# Patient Record
Sex: FEMALE | Race: WHITE
[De-identification: names, ages, dates, MRNs, and addresses within clinical notes are randomized per-mention and may not be internally consistent; named-entity substitution may affect disease eponyms.]

---

## 2017-08-26 ENCOUNTER — HEALTH MAINTENANCE LETTER (OUTPATIENT)
Age: 47
End: 2017-08-26

## 2017-11-25 ENCOUNTER — RADIANT APPOINTMENT (OUTPATIENT)
Dept: GENERAL RADIOLOGY | Facility: CLINIC | Age: 47
End: 2017-11-25
Attending: PHYSICIAN ASSISTANT
Payer: COMMERCIAL

## 2017-11-25 ENCOUNTER — OFFICE VISIT (OUTPATIENT)
Dept: URGENT CARE | Facility: URGENT CARE | Age: 47
End: 2017-11-25
Payer: COMMERCIAL

## 2017-11-25 VITALS
OXYGEN SATURATION: 98 % | TEMPERATURE: 97.6 F | WEIGHT: 116 LBS | BODY MASS INDEX: 18.21 KG/M2 | SYSTOLIC BLOOD PRESSURE: 97 MMHG | HEART RATE: 83 BPM | DIASTOLIC BLOOD PRESSURE: 62 MMHG | HEIGHT: 67 IN

## 2017-11-25 DIAGNOSIS — M25.562 ACUTE PAIN OF LEFT KNEE: ICD-10-CM

## 2017-11-25 DIAGNOSIS — M25.562 ACUTE PAIN OF LEFT KNEE: Primary | ICD-10-CM

## 2017-11-25 PROCEDURE — 73562 X-RAY EXAM OF KNEE 3: CPT | Mod: LT

## 2017-11-25 PROCEDURE — 99203 OFFICE O/P NEW LOW 30 MIN: CPT | Performed by: PHYSICIAN ASSISTANT

## 2017-11-25 NOTE — PATIENT INSTRUCTIONS
Follow up with primary care provider this week    Knee Pain  Knee pain is very common. It s especially common in active people who put a lot of pressure on their knees, like runners. It affects women more often than men.  Your kneecap (patella) is a thick, round bone. It covers and protects the front portion of your knee joint. It moves along a groove in your thighbone (femur) as part of the patellofemoral joint. A layer of cartilage surrounds the underside of your kneecap. This layer protects it from grinding against your femur.  When this cartilage softens and breaks down, it can cause knee pain. This is partly because of repetitive stress. The stress irritates the lining of the joint. This causes pain in the underlying bone.  What causes knee pain?  Many things can cause knee pain. You may have more than one cause. Some of these include:    Overuse of the knee joint    The kneecap doesn t line up with the tissue around it    Damage to small nerves in the area    Damage to the ligament-like structure that holds the kneecap in place (retinaculum)    Breakdown of the bone under the cartilage    Swelling in the soft tissues around the kneecap    Injury  You might be more likely to have knee pain if you:    Exercise a lot    Recently increased the intensity of your workouts    Have a body mass index (BMI) greater than 25    Have poor alignment of your kneecap    Walk with your feet turned overly outward or inward    Have weakness in surrounding muscle groups (inner quad or hip adductor muscles)    Have too much tightness in surrounding muscle groups (hamstrings or iliotibial band)    Have a recent history of injury to the area    Are female  Symptoms of knee pain  This type of knee pain is a dull, aching pain in the front of the knee in the area under and around the kneecap. This pain may start quickly or slowly. Your pain might be worse when you squat, run, or sit for a long time. You might also sometimes feel like  your knee is giving out. You may have symptoms in one or both of your knees.  Diagnosing knee pain  Your healthcare provider will ask about your medical history and your symptoms. Be sure to describe any activities that make your knee pain worse. He or she will look at your knee. This will include tests of your range of motion, strength, and areas of pain of your knee. Your knee alignment will be checked.  Your healthcare provider will need to rule out other causes of your knee pain, such as arthritis. You may need an imaging test, such as an X-ray or MRI.  Treatment for knee pain  Treatments that can help ease your symptoms may include:    Avoiding activities for a while that make your pain worse, returning to activity over time    Icing the outside of your knee when it causes you pain    Taking over-the-counter pain medicine    Wearing a knee brace or taping your knee to support it    Wearing special shoe inserts to help keep your feet in the proper alignment    Doing special exercises to stretch and strengthen the muscles around your hip and your knee  These steps help most people manage knee pain. But some cases of knee pain need to be treated with surgery. You may need surgery right away. Or you may need it later if other treatments don t work. Your healthcare provider may refer you to an orthopedic surgeon. He or she will talk with you about your choices.  Preventing knee pain  Losing weight and correcting excess muscle tightness or muscle weakness may help lower your risk.  In some cases, you can prevent knee pain. To help prevent a flare-up of knee pain, you do these things:    Regularly do all the exercises your doctor or physical therapist advises    Support your knee as advised by your doctor or physical therapist    Increase training gradually, and ease up on training when needed    Have an expert check your gait for running or other sporting activities    Stretch properly before and after  exercise    Replace your running shoes regularly    Lose excess weight     When to call your healthcare provider  Call your healthcare provider right away if:    Your symptoms don t get better after a few weeks of treatment    You have any new symptoms   Date Last Reviewed: 4/1/2017 2000-2017 [employer]. [address] [place] [address]. All rights reserved. This information is not intended as a substitute for professional medical care. Always follow your healthcare professional's instructions.        R.I.C.E.    R.I.C.E. stands for Rest, Ice, Compression, and Elevation. Doing these things helps limit pain and swelling after an injury. R.I.C.E. also helps injuries heal faster. Use R.I.C.E. for sprains, strains, and severe bruises or bumps. Follow the tips on this handout and begin R.I.C.E. as soon as possible after an injury.  ? Rest  Pain is your body s way of telling you to rest an injured area. Whether you have hurt an elbow, hand, foot, or knee, limiting its use will prevent further injury and help you heal.  ? Ice  Applying ice right after an injury helps prevent swelling and reduce pain. Don t place ice directly on your skin.    Wrap a cold pack or bag of ice in a thin cloth. Place it over the injured area.    Ice for 10 minutes every 3 hours. Don t ice for more than 20 minutes at a time.  ? Compression  Putting pressure (compression) on an injury helps prevent swelling and provides support.    Wrap the injured area firmly with an elastic bandage. If your hand or foot tingles, becomes discolored, or feels cold to the touch, the bandage may be too tight. Rewrap it more loosely.    If your bandage becomes too loose, rewrap it.    Do not wear an elastic bandage overnight.  ? Elevation  Keeping an injury elevated helps reduce swelling, pain, and throbbing. Elevation is most effective when the injury is kept elevated higher than the heart.     Call your healthcare provider if you notice  any of the following:    Fingers or toes feel numb, are cold to the touch, or change color    Skin looks shiny or tight    Pain, swelling, or bruising worsens and is not improved with elevation   Date Last Reviewed: 9/3/2015    6118-8329 [employer]. [address] [place] [address]. All rights reserved. This information is not intended as a substitute for professional medical care. Always follow your healthcare professional's instructions.

## 2017-11-25 NOTE — LETTER
[facility]  [address] [address]  Phone: [phone]    November 25, 2017        Ayla Del Toro  [address] [facility] [address]          To whom it may concern:    RE: Ayla Del Toro    Patient was seen and treated today at our clinic.  Please excuse absence on 11/27/17.      Please contact me for questions or concerns.      Sincerely,        Maco Mallory PA-C

## 2017-11-25 NOTE — NURSING NOTE
"Chief Complaint   Patient presents with     Urgent Care     Pt in clinic c/o left knee/leg pain.     Musculoskeletal Problem       Initial BP 97/62  Pulse 83  Temp 97.6  F (36.4  C) (Tympanic)  Ht 5' 6.5\" (1.689 m)  Wt 116 lb (52.6 kg)  SpO2 98%  BMI 18.44 kg/m2 Estimated body mass index is 18.44 kg/(m^2) as calculated from the following:    Height as of this encounter: 5' 6.5\" (1.689 m).    Weight as of this encounter: 116 lb (52.6 kg).  Medication Reconciliation: complete   Raya Sharif/ MA    "

## 2017-11-25 NOTE — MR AVS SNAPSHOT
After Visit Summary   11/25/2017    Ayla Del Toro    MRN: [id number]           Patient Information     Date Of Birth          1970        Visit Information        Provider Department      11/25/2017 10:35 AM Maco Mallory PA-C [facility]        Today's Diagnoses     Acute pain of left knee    -  1      Care Instructions    Follow up with primary care provider this week    Knee Pain  Knee pain is very common. It s especially common in active people who put a lot of pressure on their knees, like runners. It affects women more often than men.  Your kneecap (patella) is a thick, round bone. It covers and protects the front portion of your knee joint. It moves along a groove in your thighbone (femur) as part of the patellofemoral joint. A layer of cartilage surrounds the underside of your kneecap. This layer protects it from grinding against your femur.  When this cartilage softens and breaks down, it can cause knee pain. This is partly because of repetitive stress. The stress irritates the lining of the joint. This causes pain in the underlying bone.  What causes knee pain?  Many things can cause knee pain. You may have more than one cause. Some of these include:    Overuse of the knee joint    The kneecap doesn t line up with the tissue around it    Damage to small nerves in the area    Damage to the ligament-like structure that holds the kneecap in place (retinaculum)    Breakdown of the bone under the cartilage    Swelling in the soft tissues around the kneecap    Injury  You might be more likely to have knee pain if you:    Exercise a lot    Recently increased the intensity of your workouts    Have a body mass index (BMI) greater than 25    Have poor alignment of your kneecap    Walk with your feet turned overly outward or inward    Have weakness in surrounding muscle groups (inner quad or hip adductor muscles)    Have too much tightness in surrounding muscle  groups (hamstrings or iliotibial band)    Have a recent history of injury to the area    Are female  Symptoms of knee pain  This type of knee pain is a dull, aching pain in the front of the knee in the area under and around the kneecap. This pain may start quickly or slowly. Your pain might be worse when you squat, run, or sit for a long time. You might also sometimes feel like your knee is giving out. You may have symptoms in one or both of your knees.  Diagnosing knee pain  Your healthcare provider will ask about your medical history and your symptoms. Be sure to describe any activities that make your knee pain worse. He or she will look at your knee. This will include tests of your range of motion, strength, and areas of pain of your knee. Your knee alignment will be checked.  Your healthcare provider will need to rule out other causes of your knee pain, such as arthritis. You may need an imaging test, such as an X-ray or MRI.  Treatment for knee pain  Treatments that can help ease your symptoms may include:    Avoiding activities for a while that make your pain worse, returning to activity over time    Icing the outside of your knee when it causes you pain    Taking over-the-counter pain medicine    Wearing a knee brace or taping your knee to support it    Wearing special shoe inserts to help keep your feet in the proper alignment    Doing special exercises to stretch and strengthen the muscles around your hip and your knee  These steps help most people manage knee pain. But some cases of knee pain need to be treated with surgery. You may need surgery right away. Or you may need it later if other treatments don t work. Your healthcare provider may refer you to an orthopedic surgeon. He or she will talk with you about your choices.  Preventing knee pain  Losing weight and correcting excess muscle tightness or muscle weakness may help lower your risk.  In some cases, you can prevent knee pain. To help prevent a  flare-up of knee pain, you do these things:    Regularly do all the exercises your doctor or physical therapist advises    Support your knee as advised by your doctor or physical therapist    Increase training gradually, and ease up on training when needed    Have an expert check your gait for running or other sporting activities    Stretch properly before and after exercise    Replace your running shoes regularly    Lose excess weight     When to call your healthcare provider  Call your healthcare provider right away if:    Your symptoms don t get better after a few weeks of treatment    You have any new symptoms   Date Last Reviewed: 4/1/2017 2000-2017 [employer]. [address] [place] [address]. All rights reserved. This information is not intended as a substitute for professional medical care. Always follow your healthcare professional's instructions.        R.I.C.E.    R.I.C.E. stands for Rest, Ice, Compression, and Elevation. Doing these things helps limit pain and swelling after an injury. R.I.C.E. also helps injuries heal faster. Use R.I.C.E. for sprains, strains, and severe bruises or bumps. Follow the tips on this handout and begin R.I.C.E. as soon as possible after an injury.  ? Rest  Pain is your body s way of telling you to rest an injured area. Whether you have hurt an elbow, hand, foot, or knee, limiting its use will prevent further injury and help you heal.  ? Ice  Applying ice right after an injury helps prevent swelling and reduce pain. Don t place ice directly on your skin.    Wrap a cold pack or bag of ice in a thin cloth. Place it over the injured area.    Ice for 10 minutes every 3 hours. Don t ice for more than 20 minutes at a time.  ? Compression  Putting pressure (compression) on an injury helps prevent swelling and provides support.    Wrap the injured area firmly with an elastic bandage. If your hand or foot tingles, becomes discolored, or feels cold to the  "touch, the bandage may be too tight. Rewrap it more loosely.    If your bandage becomes too loose, rewrap it.    Do not wear an elastic bandage overnight.  ? Elevation  Keeping an injury elevated helps reduce swelling, pain, and throbbing. Elevation is most effective when the injury is kept elevated higher than the heart.     Call your healthcare provider if you notice any of the following:    Fingers or toes feel numb, are cold to the touch, or change color    Skin looks shiny or tight    Pain, swelling, or bruising worsens and is not improved with elevation   Date Last Reviewed: 9/3/2015    9289-3397 [employer]. [address] [place] [address]. All rights reserved. This information is not intended as a substitute for professional medical care. Always follow your healthcare professional's instructions.                Follow-ups after your visit        Who to contact     If you have questions or need follow up information about today's clinic visit or your schedule please contact [facility] directly at [phone].  Normal or non-critical lab and imaging results will be communicated to you by MyChart, letter or phone within 4 business days after the clinic has received the results. If you do not hear from us within 7 days, please contact the clinic through [employer] or phone. If you have a critical or abnormal lab result, we will notify you by phone as soon as possible.  Submit refill requests through [employer] or call your pharmacy and they will forward the refill request to us. Please allow 3 business days for your refill to be completed.          Additional Information About Your Visit        [employer] Information     [employer] lets you send messages to your doctor, view your test results, renew your prescriptions, schedule appointments and more. To sign up, go to [email or web address] . Click on \"Log in\" on the left side of the screen, which will take you to the " "Welcome page. Then click on \"Sign up Now\" on the right side of the page.     You will be asked to enter the access code listed below, as well as some personal information. Please follow the directions to create your username and password.     Your access code is: [email or web address]  Expires: 2018 12:17 PM     Your access code will  in 90 days. If you need help or a new code, please call your [place] clinic or [phone].        Care EveryWhere ID     This is your Care EveryWhere ID. This could be used by other organizations to access your [place] medical records  [id number]        Your Vitals Were     Pulse Temperature Height Pulse Oximetry BMI (Body Mass Index)       83 97.6  F (36.4  C) (Tympanic) 5' 6.5\" (1.689 m) 98% 18.44 kg/m2        Blood Pressure from Last 3 Encounters:   17 97/62   07 100/72   01/10/07 112/84    Weight from Last 3 Encounters:   17 116 lb (52.6 kg)   07 146 lb 4 oz (66.3 kg)   01/10/07 151 lb 8 oz (68.7 kg)               Primary Care Provider Office Phone # Fax #    [facility] [phone] [address] [facility] [address]        Equal Access to Services     NONI HERMAN AH: Hadtia Potts, waaxda darryl, qaybta kaalkenisha carcamo. So [facility] [phone].    ATENCIÓN: Si habla español, tiene a nogueira disposición servicios gratuitos de asistencia lingüística. Oneyda al [phone].    We comply with applicable federal civil rights laws and [place] laws. We do not discriminate on the basis of race, color, national origin, age, disability, sex, sexual orientation, or gender identity.            Thank you!     Thank you for choosing [facility]  for your care. Our goal is always to provide you with excellent care. Hearing back from our patients is one way we can continue to improve our services. Please take a few minutes to complete " the written survey that you may receive in the mail after your visit with us. Thank you!             Your Updated Medication List - Protect others around you: Learn how to safely use, store and throw away your medicines at www.disposemymeds.org.          This list is accurate as of: 11/25/17 12:17 PM.  Always use your most recent med list.                   Brand Name Dispense Instructions for use Diagnosis    klonoPIN 0.5 MG tablet   Generic drug:  clonazePAM     30    1 TABLET AS NEEDED; MAX 3 TIMES DAILY    Anxiety state, unspecified, Depressive disorder, not elsewhere classified, Allergic rhinitis due to other allergen, Routine general medical examination at a health care facility       loratadine 10 MG tablet    CLARITIN    3 MONTHS    ONE DAILY    Allergic rhinitis due to other allergen       XANAX 0.5 MG tablet   Generic drug:  ALPRAZolam     20    1 tablet BID as needed    Anxiety state, unspecified, Depressive disorder, not elsewhere classified, Allergic rhinitis due to other allergen, Routine general medical examination at a health care facility       ZYRTEC 10 MG tablet   Generic drug:  cetirizine     3 MONTHS    ONE TABLET DAILY    Allergic rhinitis due to other allergen

## 2017-11-25 NOTE — PROGRESS NOTES
"SUBJECTIVE:  Chief Complaint   Patient presents with     Urgent Care     Pt in clinic c/o left knee/leg pain.     Musculoskeletal Problem     Ayla Del Toro is a 47 year old female presents with a chief complaint of left knee pain.  The injury occurred standing up from table last night.  History of left knee pain and exacerbations, but never like this.  Pain up to 8/10.      Able to bend, and support weight, but pain is exacerbated with weight bearing.  No locking or giving out.  No numbness, tingling, fever, or swelling.  No calf pain.       Past Medical History:   Diagnosis Date     Allergic rhinitis due to other allergen      Anxiety state, unspecified      Depressive disorder, not elsewhere classified      Current Outpatient Prescriptions   Medication Sig Dispense Refill     diclofenac (VOLTAREN) 50 MG EC tablet Take 1 tablet (50 mg) by mouth 3 times daily as needed for moderate pain 30 tablet 1     diclofenac (VOLTAREN) 50 MG EC tablet Take 1 tablet (50 mg) by mouth 3 times daily as needed for moderate pain 30 tablet 1     LORATADINE 10 MG OR TABS ONE DAILY 3 MONTHS 1 YEAR     ZYRTEC 10 MG OR TABS ONE TABLET DAILY 3 MONTHS 1 YEAR     XANAX 0.5 MG OR TABS 1 tablet BID as needed  20 0     KLONOPIN 0.5 MG OR TABS 1 TABLET AS NEEDED; MAX 3 TIMES DAILY 30 3     Social History   Substance Use Topics     Smoking status: Current Some Day Smoker     Last attempt to quit: 5/18/2006     Smokeless tobacco: Current User      Comment: recreationally     Alcohol use Yes       ROS:  Review of systems negative except as stated above.    EXAM:   BP 97/62  Pulse 83  Temp 97.6  F (36.4  C) (Tympanic)  Ht 5' 6.5\" (1.689 m)  Wt 116 lb (52.6 kg)  SpO2 98%  BMI 18.44 kg/m2  Gen: healthy,alert,no distress  Extremity: knee has point tenderness of lateral aspect.   There is not compromise to the distal circulation.  Pulses are +2 and CRT is brisk  CHEST: clear to auscultation  CV: regular rate and rhythm  MS: no gross deformities " noted, no evidence of inflammation in joints, FROM in all extremities.  SKIN: no suspicious lesions or rashes  NEURO: Normal strength and tone, sensory exam grossly normal, mentation intact and speech normal    X-RAY was WNL on initial read.  Radiologist read pending    ASSESSMENT:   (M25.562) Acute pain of left knee  (primary encounter diagnosis)  Comment: No swelling, discoloration appreciated.  Point tender at lateral aspect.  Joint intact  Plan: XR Knee Left 3 Views, diclofenac (VOLTAREN) 50         MG EC tablet, diclofenac (VOLTAREN) 50 MG EC         tablet        Follow up with PCP this week for further imaging.  Return sooner if pain is unbearable.      Patient Instructions     Follow up with primary care provider this week    Knee Pain  Knee pain is very common. It s especially common in active people who put a lot of pressure on their knees, like runners. It affects women more often than men.  Your kneecap (patella) is a thick, round bone. It covers and protects the front portion of your knee joint. It moves along a groove in your thighbone (femur) as part of the patellofemoral joint. A layer of cartilage surrounds the underside of your kneecap. This layer protects it from grinding against your femur.  When this cartilage softens and breaks down, it can cause knee pain. This is partly because of repetitive stress. The stress irritates the lining of the joint. This causes pain in the underlying bone.  What causes knee pain?  Many things can cause knee pain. You may have more than one cause. Some of these include:    Overuse of the knee joint    The kneecap doesn t line up with the tissue around it    Damage to small nerves in the area    Damage to the ligament-like structure that holds the kneecap in place (retinaculum)    Breakdown of the bone under the cartilage    Swelling in the soft tissues around the kneecap    Injury  You might be more likely to have knee pain if you:    Exercise a lot    Recently  increased the intensity of your workouts    Have a body mass index (BMI) greater than 25    Have poor alignment of your kneecap    Walk with your feet turned overly outward or inward    Have weakness in surrounding muscle groups (inner quad or hip adductor muscles)    Have too much tightness in surrounding muscle groups (hamstrings or iliotibial band)    Have a recent history of injury to the area    Are female  Symptoms of knee pain  This type of knee pain is a dull, aching pain in the front of the knee in the area under and around the kneecap. This pain may start quickly or slowly. Your pain might be worse when you squat, run, or sit for a long time. You might also sometimes feel like your knee is giving out. You may have symptoms in one or both of your knees.  Diagnosing knee pain  Your healthcare provider will ask about your medical history and your symptoms. Be sure to describe any activities that make your knee pain worse. He or she will look at your knee. This will include tests of your range of motion, strength, and areas of pain of your knee. Your knee alignment will be checked.  Your healthcare provider will need to rule out other causes of your knee pain, such as arthritis. You may need an imaging test, such as an X-ray or MRI.  Treatment for knee pain  Treatments that can help ease your symptoms may include:    Avoiding activities for a while that make your pain worse, returning to activity over time    Icing the outside of your knee when it causes you pain    Taking over-the-counter pain medicine    Wearing a knee brace or taping your knee to support it    Wearing special shoe inserts to help keep your feet in the proper alignment    Doing special exercises to stretch and strengthen the muscles around your hip and your knee  These steps help most people manage knee pain. But some cases of knee pain need to be treated with surgery. You may need surgery right away. Or you may need it later if other  treatments don t work. Your healthcare provider may refer you to an orthopedic surgeon. He or she will talk with you about your choices.  Preventing knee pain  Losing weight and correcting excess muscle tightness or muscle weakness may help lower your risk.  In some cases, you can prevent knee pain. To help prevent a flare-up of knee pain, you do these things:    Regularly do all the exercises your doctor or physical therapist advises    Support your knee as advised by your doctor or physical therapist    Increase training gradually, and ease up on training when needed    Have an expert check your gait for running or other sporting activities    Stretch properly before and after exercise    Replace your running shoes regularly    Lose excess weight     When to call your healthcare provider  Call your healthcare provider right away if:    Your symptoms don t get better after a few weeks of treatment    You have any new symptoms   Date Last Reviewed: 4/1/2017 2000-2017 [employer]. [address] [place] [address]. All rights reserved. This information is not intended as a substitute for professional medical care. Always follow your healthcare professional's instructions.        R.I.C.E.    R.I.C.E. stands for Rest, Ice, Compression, and Elevation. Doing these things helps limit pain and swelling after an injury. R.I.C.E. also helps injuries heal faster. Use R.I.C.E. for sprains, strains, and severe bruises or bumps. Follow the tips on this handout and begin R.I.C.E. as soon as possible after an injury.  ? Rest  Pain is your body s way of telling you to rest an injured area. Whether you have hurt an elbow, hand, foot, or knee, limiting its use will prevent further injury and help you heal.  ? Ice  Applying ice right after an injury helps prevent swelling and reduce pain. Don t place ice directly on your skin.    Wrap a cold pack or bag of ice in a thin cloth. Place it over the injured  area.    Ice for 10 minutes every 3 hours. Don t ice for more than 20 minutes at a time.  ? Compression  Putting pressure (compression) on an injury helps prevent swelling and provides support.    Wrap the injured area firmly with an elastic bandage. If your hand or foot tingles, becomes discolored, or feels cold to the touch, the bandage may be too tight. Rewrap it more loosely.    If your bandage becomes too loose, rewrap it.    Do not wear an elastic bandage overnight.  ? Elevation  Keeping an injury elevated helps reduce swelling, pain, and throbbing. Elevation is most effective when the injury is kept elevated higher than the heart.     Call your healthcare provider if you notice any of the following:    Fingers or toes feel numb, are cold to the touch, or change color    Skin looks shiny or tight    Pain, swelling, or bruising worsens and is not improved with elevation   Date Last Reviewed: 9/3/2015    4809-5207 [employer]. [address] [place] [address], [place] [id number]. All rights reserved. This information is not intended as a substitute for professional medical care. Always follow your healthcare professional's instructions.

## 2018-03-19 ENCOUNTER — HOSPITAL ENCOUNTER (OUTPATIENT)
Dept: BEHAVIORAL HEALTH | Facility: CLINIC | Age: 48
Discharge: HOME | End: 2018-03-19
Attending: SOCIAL WORKER | Admitting: SOCIAL WORKER
Payer: COMMERCIAL

## 2018-03-19 VITALS
HEART RATE: 100 BPM | WEIGHT: 117.2 LBS | BODY MASS INDEX: 18.84 KG/M2 | HEIGHT: 66 IN | SYSTOLIC BLOOD PRESSURE: 141 MMHG | DIASTOLIC BLOOD PRESSURE: 98 MMHG

## 2018-03-19 PROCEDURE — H0001 ALCOHOL AND/OR DRUG ASSESS: HCPCS

## 2018-03-19 RX ORDER — FLUTICASONE PROPIONATE 50 MCG
SPRAY, SUSPENSION (ML) NASAL
COMMUNITY
Start: 2017-04-14 | End: 2018-04-10

## 2018-03-19 ASSESSMENT — ANXIETY QUESTIONNAIRES
5. BEING SO RESTLESS THAT IT IS HARD TO SIT STILL: NOT AT ALL
2. NOT BEING ABLE TO STOP OR CONTROL WORRYING: MORE THAN HALF THE DAYS
GAD7 TOTAL SCORE: 14
3. WORRYING TOO MUCH ABOUT DIFFERENT THINGS: MORE THAN HALF THE DAYS
1. FEELING NERVOUS, ANXIOUS, OR ON EDGE: NEARLY EVERY DAY
7. FEELING AFRAID AS IF SOMETHING AWFUL MIGHT HAPPEN: MORE THAN HALF THE DAYS
4. TROUBLE RELAXING: MORE THAN HALF THE DAYS
6. BECOMING EASILY ANNOYED OR IRRITABLE: NEARLY EVERY DAY

## 2018-03-19 ASSESSMENT — PAIN SCALES - GENERAL: PAINLEVEL: MILD PAIN (2)
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
## 2018-03-19 NOTE — PROGRESS NOTES
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 #2)    No    5. Support group participation: Have you/do you attend 12-step or other support group meetings? How recently? What was your experience? Are you willing to restart? If the person has not participated, is he or she willing?  (DSM-5: Sx #2)    She went to AA once in . This was at suggestion of her therapist. She enjoyed it, but got hit on a lot.    6. Do you drink alcohol or use other drugs in larger amounts than intended or over a longer period of time than was intended?  (DSM-5: Sx #1)    No. She is very consistent with her drinking.    7. Do you spend a great deal of time engaged in activities necessary to obtain alcohol or other drugs, a great deal of time using alcohol or other drugs, or a great deal of time recovering from alcohol or other drug use?  (DSM-5: Sx #3)    Yes, explain: She will drink every 1-2 hours through out the day. She is very consistent with her drinking.    Dimension V Ratings Summary   Relapse/Continued Use/Continued problem potential - The placing authority must use the criteria in Dimension V to determine a client s relapse, continued use, and continued problem potential.   RISK DESCRIPTIONS - Severity ratin No awareness of the negative impact of mental health problems or substance abuse. No coping skills to arrest mental health " "or addiction illnesses, or prevent relapse.    REASONS SEVERITY WAS ASSIGNED - (What information did the person provide that indicates his or her understanding of relapse issues? What about the person s experience indicates how prone he or she is to relapse? What coping skills does the person have that decrease relapse potential?)      Pt denies having ever attended a cd treatment program or a detox program before.  In 2002, at the suggestion of her therapist, she went to an AA meeting. She enjoyed it, but got hit on a lot.  Pt lacks education into her disease of addiction. Pt admits to having cravings to drink and will drink every 1-2 hours throughout the day.  Pt identified her triggers or reasons to drink as \"being awake, anger and hate.\"  Pt lacks impulse control and long-term sober maintenance skills.  Pt lacks insight into the effects her use has had on her physical and mental health. Pt is at a high risk for continued use.       DIMENSION VI - Recovery Environment     1. Are you employed or attending school?    She is not working right now. She last worked March 2, 2018. She denies alcohol playing a role into why she is not working. Pt has worked as a .    2. If working or a student, are you able to function appropriately in that setting?     Yes    3. Has your job and/or school work been negatively impacted by your use of alcohol of other drugs?  (DSM-5: Sx #5 & Sx #7)    No. She denies drinking while she was working.    4. How would you describe your current finances?  Doing well     5. Are you having financial problems, such as money being tight, living paycheck to paycheck, having unpaid or late bills, having significant debt, a history of bankruptcy, or IRS problems?    No     6. Describe a typical day; evening for you. Work, school, social, leisure activities, volunteer, exercise, spiritual practices or other daily tasks.    \"wake up at 3am, go back to sleep. Check my Facebook. I am " "usually up by 7 or 8am. I got to watch The Young and The Restless at 11. My  leaves at 6pm and doesn't get back until 11pm. I try to eat, but can't.\" She will drink consistently throughout the day.    7. Have you reduced or discontinued recreational activities, hobbies or other leisure activities as a result of your use of alcohol or other drugs?  (DSM-5: Sx #7)    No    8. Who do you live with?      She lives with her .    9. Are there any people in the home who have current substance abuse issues or have mental health issues?     No    10. Tell me about your living environment/neighborhood? Ask enough follow up questions to determine safety, criminal activity, availability of alcohol and drugs, supportive or antagonistic to the person making changes.      No concerns.    11. Are you concerned for your safety or anyone else's safety in the home? No    12. Do you have plans to move somewhere else or change your living environment in any manner?    No    13. Do you have children who live with you?     No    14. Do you have children who do not live with you?     Yes. She has two teen daughters who live with their father.    15. Do you have any history of being involved with Child Protection Services? No     16. Are you currently in a significant relationship?     Yes, if yes:  How long have you been in the relationship?  Together since 4/13/2012, and  since 4/13/2017.    17. How do you identify your sexual orientation?    queer    18. The patient reported: .    19. Does your significant other have a history of substance abuse or have current substance abuse issues?    No    20. How important is substance use to your social connections? Do many of your family or friends use?     \"I don't have any social connections anymore.\"    21. Who in your life would you consider to be your primary support network at this time?    Her .    22. Have any of your relationships (S.O., family members, " friends, employers, teachers, etc.) been negatively impacted by your use of alcohol or other drugs?  (DSM-5: Sx #6)    Yes, If yes explain: with her  and teen daughters.    23. Do you currently participate in community howie activities, such as attending Sikh, temple, Yarsani or Moravian services?  No    24. Criminal justice history: Gather current/recent history and any significant history related to substance use--Arrests? Convictions? Circumstances? Alcohol or drug involvement? Sentences? Still on probation or parole? Expectations of the court? Current court order?  (DSM-5: Sx #8)    None    25. Are you or have you ever been a registered sex offender? No    26. Do you have a child protection worker,  or ? No    27. Do you have a valid 's license?  Yes    28. What obstacles exist to participating in treatment? (Time off work, childcare, funding, transportation, pending [facility] time, living situation)     None    Dimension VI Ratings Summary   Recovery environment - The placing authority must use the criteria in Dimension VI to determine a client s recovery environment.   RISK DESCRIPTIONS - Severity rating: 3 Client is not engaged in structured, meaningful activity and the client s peers, family, significant other, and living environment are unsupportive, or there is significant criminal justice system involvement.    REASONS SEVERITY WAS ASSIGNED - (What support does the person have for making changes? What structure/stability does the person have in his or her daily life that will increase the likelihood that changes can be sustained? What problems exist in the person s environment that will jeopardize getting/staying clean and sober?)     The patient currently lives with her  of a year. The patient denied having any concerns regarding her immediate living environment or neighborhood.  The patient reported having a relationship conflict with her  and her  teen daughters due to her ongoing alcohol abuse issues.  The patient denied having a history of legal issues.  The patient is unemployed and she last worked 3/2/2018. The patient denied having any increased financial stress. The patient lacks a current sober peer support network.       Mental Health Status   Physical Appearance/Attire: Appears stated age   Hygiene: well groomed   Eye Contact: at examiner   Speech Rate:  regular   Speech Volume: regular   Speech Quality: fluid and sarcastic   Cognitive/Perceptual:  reality based   Cognition: memory intact    Judgment: intact   Insight: intact   Orientation:  time, place, person and situation   Thought:   logical    Hallucinations:  none   General Behavioral Tone: cooperative   Psychomotor Activity: no problem noted   Gait:  no problem   Mood: anxious, depressed and intoxicated   Affect: intoxicated   Counselor Notes: pt's VICTOR HUGO was 0.127 at the beginning of the assessment.     Patient Choices/Exceptions     Would you like services specific to language, age, gender, culture, Jewish preference, race, ethnicity, sexual orientation or disability?  No    What particular treatment choices and options would you like to have? Inpatient.    Do you have a preference for a particular treatment program? [facility]    Patient is willing to follow treatment recommendations.  Yes    DSM-5 Criteria for Substance Use Disorder   Criteria for Diagnosis  Instructions: Determine whether the client currently meets the criteria for Substance Use Disorder using the diagnostic criteria in the DSM-5 pp.481-58. Current means during the most recent 12 months outside a facility that controls access to substances.    A problematic pattern of alcohol/drug use leading to clinically significant impairment or distress, as manifested by at least two of the following, occurring within a 12-month period:    3. A great deal of time is spent in activities necessary to obtain alcohol/drug,  use alcohol/drug, or recover from its effects.  4. Craving, or a strong desire or urge to use alcohol/drug  6. Continued alcohol use despite having persistent or recurrent social or interpersonal problems caused or exacerbated by the effects of alcohol/drug.  9. Alcohol/drug use is continued despite knowledge of having a persistent or recurrent physical or psychological problem that is likely to have been caused or exacerbated by alcohol.  10. Tolerance, as defined by either of the following: A need for markedly increased amounts of alcohol/drug to achieve intoxication or desired effect.  11. Withdrawal, as manifested by either of the following: The characteristic withdrawal syndrome for alcohol/drug (refer to Criteria A and B of the criteria set for alcohol/drug withdrawal).          Specify if: In early remission:  After full criteria for alcohol/drug use disorder were previously met, none of the criteria for alcohol/drug use disorder have been met for at least 3 months but for less than 12 months (with the exception that Criterion A4,  Craving or a strong desire or urge to use alcohol/drug  may be met).     In sustained remission:   After full criteria for alcohol use disorder were previously met, non of the criteria for alcohol/drug use disorder have been met at any time during a period of 12 months or longer (with the exception that Criterion A4,  Craving or strong desire or urge to use alcohol/drug  may be met).   Specify if:   This additional specifier is used if the individual is in an environment where access to alcohol is restricted.    Mild: Presence of 2-3 symptoms  Moderate: Presence of 4-5 symptoms  Severe: Presence of 6 or more symptoms    DSM-5 Substance Use Disorder Diagnosis     Alcohol Use Disorder Severe - 303.90 (F10.20)    Collateral Contact Summary     Number of contacts made:  1    Contact with referring person:  NA    If court related records were reviewed, summarize here:  NA    Information  from collateral contacts supported/largely agreed with information from the client and associated risk ratings.    Collateral Contact      Name: Relationship: Phone number: Releases:   [facility] EMR NA NA     The patient's medical record at [facility] was reviewed and the information contained in the medical record supported the patient's account of her chemical use history and chemical use consequences.      Recommendations     1)  Complete a medical detox program, such as at [facility], [facility], [place], [address] [place] [address].  2)  Complete a residential based or similar treatment program, such as [facility].   3)  Abstain from all mood-altering chemicals unless prescribed by a licensed provider.   4)  Attend, at minimum, 2 weekly support group meetings, such as 12 step based (AA/NA), SMART Recovery, Health Realizations, and/or [facility] meetings.     5)  Actively work with a female mentor/sponsor on a weekly basis.   6)  Follow all the recommendations of your treatment/medical providers.      Level of Care   I.) Intoxication and Withdrawal: 2   II.) Biomedical:  1   III.) Emotional and Behavioral:  2   IV.) Readiness to Change:  2   V.) Relapse Potential: 4   VI.) Recovery Environmental: 3     Initial Problem List     The patient lacks relapse prevention skills  The patient has poor coping skills  The patient has poor refusal skills   The patient lacks a sober peer support network  The patient has a tendency to isolate  The patient has dual issues of MI and CD  The patient lacks the ability to effectively manage his/her mental health issues  The patient has a significant history of trauma and/or abuse issues

## 2018-03-20 ASSESSMENT — ANXIETY QUESTIONNAIRES: GAD7 TOTAL SCORE: 14

## 2018-03-20 ASSESSMENT — PATIENT HEALTH QUESTIONNAIRE - PHQ9: SUM OF ALL RESPONSES TO PHQ QUESTIONS 1-9: 21
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
## 2018-04-10 ENCOUNTER — HOSPITAL ENCOUNTER (INPATIENT)
Facility: CLINIC | Age: 48
LOS: 6 days | Discharge: PSYCHIATRIC OR SUBSTANCE USE FACILITY | DRG: 897 | End: 2018-04-16
Attending: EMERGENCY MEDICINE | Admitting: PSYCHIATRY & NEUROLOGY
Payer: COMMERCIAL

## 2018-04-10 ENCOUNTER — TELEPHONE (OUTPATIENT)
Dept: BEHAVIORAL HEALTH | Facility: CLINIC | Age: 48
End: 2018-04-10

## 2018-04-10 DIAGNOSIS — F33.1 MODERATE EPISODE OF RECURRENT MAJOR DEPRESSIVE DISORDER (H): Primary | ICD-10-CM

## 2018-04-10 DIAGNOSIS — J30.1 CHRONIC ALLERGIC RHINITIS DUE TO POLLEN, UNSPECIFIED SEASONALITY: ICD-10-CM

## 2018-04-10 DIAGNOSIS — F10.229 ACUTE ALCOHOLIC INTOXICATION IN ALCOHOLISM WITH COMPLICATION, CONTINUOUS DRINKING BEHAVIOR (H): ICD-10-CM

## 2018-04-10 DIAGNOSIS — F10.20 UNCOMPLICATED ALCOHOL DEPENDENCE (H): ICD-10-CM

## 2018-04-10 PROBLEM — F10.10 ALCOHOL ABUSE: Status: ACTIVE | Noted: 2018-04-10

## 2018-04-10 LAB
ALBUMIN SERPL-MCNC: 3.8 G/DL (ref 3.4–5)
ALCOHOL BREATH TEST: 0.43 (ref 0–0.01)
ALP SERPL-CCNC: 67 U/L (ref 40–150)
ALT SERPL W P-5'-P-CCNC: 60 U/L (ref 0–50)
AMPHETAMINES UR QL SCN: NEGATIVE
ANION GAP SERPL CALCULATED.3IONS-SCNC: 10 MMOL/L (ref 3–14)
AST SERPL W P-5'-P-CCNC: 88 U/L (ref 0–45)
BARBITURATES UR QL: NEGATIVE
BASOPHILS # BLD AUTO: 0.1 10E9/L (ref 0–0.2)
BASOPHILS NFR BLD AUTO: 1.5 %
BENZODIAZ UR QL: NEGATIVE
BILIRUB SERPL-MCNC: 0.2 MG/DL (ref 0.2–1.3)
BUN SERPL-MCNC: 6 MG/DL (ref 7–30)
CALCIUM SERPL-MCNC: 8.3 MG/DL (ref 8.5–10.1)
CANNABINOIDS UR QL SCN: POSITIVE
CHLORIDE SERPL-SCNC: 112 MMOL/L (ref 94–109)
CO2 SERPL-SCNC: 23 MMOL/L (ref 20–32)
COCAINE UR QL: NEGATIVE
CREAT SERPL-MCNC: 0.42 MG/DL (ref 0.52–1.04)
DIFFERENTIAL METHOD BLD: ABNORMAL
EOSINOPHIL # BLD AUTO: 0.2 10E9/L (ref 0–0.7)
EOSINOPHIL NFR BLD AUTO: 4.5 %
ERYTHROCYTE [DISTWIDTH] IN BLOOD BY AUTOMATED COUNT: 12.4 % (ref 10–15)
ETHANOL UR QL SCN: POSITIVE
GFR SERPL CREATININE-BSD FRML MDRD: >90 ML/MIN/1.7M2
GLUCOSE SERPL-MCNC: 88 MG/DL (ref 70–99)
HCT VFR BLD AUTO: 39.7 % (ref 35–47)
HGB BLD-MCNC: 13.2 G/DL (ref 11.7–15.7)
IMM GRANULOCYTES # BLD: 0 10E9/L (ref 0–0.4)
IMM GRANULOCYTES NFR BLD: 0.2 %
LYMPHOCYTES # BLD AUTO: 2.3 10E9/L (ref 0.8–5.3)
LYMPHOCYTES NFR BLD AUTO: 42.5 %
MAGNESIUM SERPL-MCNC: 2.1 MG/DL (ref 1.6–2.3)
MCH RBC QN AUTO: 35.4 PG (ref 26.5–33)
MCHC RBC AUTO-ENTMCNC: 33.2 G/DL (ref 31.5–36.5)
MCV RBC AUTO: 106 FL (ref 78–100)
MONOCYTES # BLD AUTO: 0.3 10E9/L (ref 0–1.3)
MONOCYTES NFR BLD AUTO: 6.3 %
NEUTROPHILS # BLD AUTO: 2.4 10E9/L (ref 1.6–8.3)
NEUTROPHILS NFR BLD AUTO: 45 %
NRBC # BLD AUTO: 0 10*3/UL
NRBC BLD AUTO-RTO: 0 /100
OPIATES UR QL SCN: NEGATIVE
PLATELET # BLD AUTO: 243 10E9/L (ref 150–450)
POTASSIUM SERPL-SCNC: 3.9 MMOL/L (ref 3.4–5.3)
PROT SERPL-MCNC: 7.7 G/DL (ref 6.8–8.8)
RBC # BLD AUTO: 3.73 10E12/L (ref 3.8–5.2)
SODIUM SERPL-SCNC: 145 MMOL/L (ref 133–144)
WBC # BLD AUTO: 5.4 10E9/L (ref 4–11)

## 2018-04-10 PROCEDURE — 80307 DRUG TEST PRSMV CHEM ANLYZR: CPT | Performed by: FAMILY MEDICINE

## 2018-04-10 PROCEDURE — 83735 ASSAY OF MAGNESIUM: CPT | Performed by: FAMILY MEDICINE

## 2018-04-10 PROCEDURE — 99283 EMERGENCY DEPT VISIT LOW MDM: CPT | Mod: Z6 | Performed by: FAMILY MEDICINE

## 2018-04-10 PROCEDURE — 80320 DRUG SCREEN QUANTALCOHOLS: CPT | Performed by: FAMILY MEDICINE

## 2018-04-10 PROCEDURE — 85025 COMPLETE CBC W/AUTO DIFF WBC: CPT | Performed by: FAMILY MEDICINE

## 2018-04-10 PROCEDURE — 80053 COMPREHEN METABOLIC PANEL: CPT | Performed by: FAMILY MEDICINE

## 2018-04-10 PROCEDURE — HZ2ZZZZ DETOXIFICATION SERVICES FOR SUBSTANCE ABUSE TREATMENT: ICD-10-PCS | Performed by: PSYCHIATRY & NEUROLOGY

## 2018-04-10 PROCEDURE — 25000132 ZZH RX MED GY IP 250 OP 250 PS 637: Performed by: PSYCHIATRY & NEUROLOGY

## 2018-04-10 PROCEDURE — 12800012 ZZH R&B CD MH INTERMEDIATE ADULT

## 2018-04-10 PROCEDURE — 99285 EMERGENCY DEPT VISIT HI MDM: CPT | Mod: 25 | Performed by: FAMILY MEDICINE

## 2018-04-10 PROCEDURE — 82075 ASSAY OF BREATH ETHANOL: CPT | Performed by: FAMILY MEDICINE

## 2018-04-10 RX ORDER — HYDROXYZINE HYDROCHLORIDE 25 MG/1
25 TABLET, FILM COATED ORAL EVERY 4 HOURS PRN
Status: DISCONTINUED | OUTPATIENT
Start: 2018-04-10 | End: 2018-04-16 | Stop reason: HOSPADM

## 2018-04-10 RX ORDER — MULTIPLE VITAMINS W/ MINERALS TAB 9MG-400MCG
1 TAB ORAL DAILY
Status: DISCONTINUED | OUTPATIENT
Start: 2018-04-10 | End: 2018-04-16 | Stop reason: HOSPADM

## 2018-04-10 RX ORDER — FLUTICASONE PROPIONATE 50 MCG
1-2 SPRAY, SUSPENSION (ML) NASAL 2 TIMES DAILY PRN
Status: DISCONTINUED | OUTPATIENT
Start: 2018-04-10 | End: 2018-04-16 | Stop reason: HOSPADM

## 2018-04-10 RX ORDER — DIPHENHYDRAMINE HCL 25 MG
25 CAPSULE ORAL
Status: DISCONTINUED | OUTPATIENT
Start: 2018-04-10 | End: 2018-04-16 | Stop reason: HOSPADM

## 2018-04-10 RX ORDER — FOLIC ACID 1 MG/1
1 TABLET ORAL DAILY
Status: DISCONTINUED | OUTPATIENT
Start: 2018-04-10 | End: 2018-04-16 | Stop reason: HOSPADM

## 2018-04-10 RX ORDER — BISACODYL 10 MG
10 SUPPOSITORY, RECTAL RECTAL DAILY PRN
Status: DISCONTINUED | OUTPATIENT
Start: 2018-04-10 | End: 2018-04-16 | Stop reason: HOSPADM

## 2018-04-10 RX ORDER — ACETAMINOPHEN 325 MG/1
650 TABLET ORAL EVERY 4 HOURS PRN
Status: DISCONTINUED | OUTPATIENT
Start: 2018-04-10 | End: 2018-04-16 | Stop reason: HOSPADM

## 2018-04-10 RX ORDER — CETIRIZINE HYDROCHLORIDE 10 MG/1
10 TABLET ORAL DAILY
Status: DISCONTINUED | OUTPATIENT
Start: 2018-04-11 | End: 2018-04-16 | Stop reason: HOSPADM

## 2018-04-10 RX ORDER — DIAZEPAM 5 MG
5-20 TABLET ORAL EVERY 30 MIN PRN
Status: DISCONTINUED | OUTPATIENT
Start: 2018-04-10 | End: 2018-04-16

## 2018-04-10 RX ORDER — CETIRIZINE HYDROCHLORIDE 10 MG/1
10 TABLET ORAL DAILY
Status: ON HOLD | COMMUNITY
End: 2018-04-16

## 2018-04-10 RX ORDER — IBUPROFEN 200 MG
200 TABLET ORAL
Status: DISCONTINUED | OUTPATIENT
Start: 2018-04-10 | End: 2018-04-16 | Stop reason: HOSPADM

## 2018-04-10 RX ORDER — ALUMINA, MAGNESIA, AND SIMETHICONE 2400; 2400; 240 MG/30ML; MG/30ML; MG/30ML
30 SUSPENSION ORAL EVERY 4 HOURS PRN
Status: DISCONTINUED | OUTPATIENT
Start: 2018-04-10 | End: 2018-04-16 | Stop reason: HOSPADM

## 2018-04-10 RX ORDER — LANOLIN ALCOHOL/MO/W.PET/CERES
100 CREAM (GRAM) TOPICAL DAILY
Status: DISPENSED | OUTPATIENT
Start: 2018-04-10 | End: 2018-04-13

## 2018-04-10 RX ORDER — FLUTICASONE PROPIONATE 50 MCG
1-2 SPRAY, SUSPENSION (ML) NASAL DAILY
Status: DISCONTINUED | OUTPATIENT
Start: 2018-04-11 | End: 2018-04-10

## 2018-04-10 RX ORDER — FLUTICASONE PROPIONATE 50 MCG
1-2 SPRAY, SUSPENSION (ML) NASAL DAILY
COMMUNITY

## 2018-04-10 RX ORDER — TRAZODONE HYDROCHLORIDE 50 MG/1
50 TABLET, FILM COATED ORAL
Status: DISCONTINUED | OUTPATIENT
Start: 2018-04-10 | End: 2018-04-16 | Stop reason: HOSPADM

## 2018-04-10 RX ADMIN — HYDROXYZINE HYDROCHLORIDE 25 MG: 25 TABLET, FILM COATED ORAL at 20:06

## 2018-04-10 RX ADMIN — DIAZEPAM 5 MG: 5 TABLET ORAL at 21:50

## 2018-04-10 ASSESSMENT — ACTIVITIES OF DAILY LIVING (ADL)
ORAL_HYGIENE: INDEPENDENT
GROOMING: INDEPENDENT
DRESS: INDEPENDENT

## 2018-04-10 NOTE — IP AVS SNAPSHOT
MRN:[id number]                      After Visit Summary   4/10/2018    Ayla Del Toro    MRN: [id number]           Thank you!     Thank you for choosing [facility] for your care. Our goal is always to provide you with excellent care.        Patient Information     Date Of Birth          1970        Designated Caregiver       Most Recent Value    Caregiver    Will someone help with your care after discharge? no      About your hospital stay     You were admitted on:  April 10, 2018 You last received care in the:  [facility]    You were discharged on:  April 16, 2018       Who to Call     For medical emergencies, please call 391.  For non-urgent questions about your medical care, please call your primary care provider or clinic, [phone]          Attending Provider     Provider Specialty    Driss Castillo MD Emergency Medicine    [facility], MD [facility]mar[facility], Shiva Kaplan MD Psychiatry       Primary Care Provider Office Phone # Fax #    [facility] [phone]      Follow-up Appointments     Follow Up ([facility])       Follow up with primary care provider, [facility], within 1 month of discharge for evaluation of chest pain and repeat liver function tests. PCP to consider outpatient stress test.     Appointments on [place] and/or [facility] (with [facility] or [facility] provider or service). Call [phone] if you haven't heard regarding these appointments within 7 days of discharge.                  Your next 10 appointments already scheduled     Apr 16, 2018 12:00 PM CDT   Treatment with LP/DOP ADMISSIONS   [facility] ([facility], [facility])    [address] [place] [id number] [phone]              Further instructions from your care team       Behavioral Discharge Planning and Instructions  THANK YOU FOR CHOOSING THE [facility]  [address]  [phone]    Summary: You were admitted to [facility] on 4/10/18 for detoxification from alcohol.  A medical exam was performed that included lab work. You have met with a  and been referred to [facility].  Please take care and make your recovery a priority Ayla! It was a pleasure working with you and the treatment team wishes you the very best in your recovery!  ~Espinoza    Recommendation:  Residential Treatment, psychotherapy, sober support group engagement and active work with a sponsor or  through [facility] Connection.    Main Diagnosis: Per Dr. Shiva Bridges MD;  DIAGNOSES:   Axis I:     1.  Alcohol use disorder, severe dependence.   2.  History of major depressive disorder.  Rule out alcohol induced depression.   3.  Nicotine use disorder.   Axis II:  Deferred.   Axis III:  See past medical history.       Major Treatments, Procedures and Findings:  You were detoxed from alcohol with the Modified Selective Severity Protocol using Valium. You have met with a  to develop a treatment plan for discharge.  You have had labs drawn and a copy of those labs will be sent home with you.  Please bring your lab results with to your follow up doctor appointment.    Symptoms to Report:  If you experience more anxiety, confusion, sleeplessness, deep sadness or thoughts of suicide, notify your treatment team or notify your primary care physician. IF ANY OF THE SYMPTOMS YOU ARE EXPERIENCING ARE A MEDICAL EMERGENCY CALL 911 IMMEDIATELY.     Lifestyle Adjustment: Adjust your lifestyle to get enough sleep, relaxation, exercise and  good nutrition. Continue to develop healthy coping skills to decrease stress and promote a sober living environment. Do not use alcohol, illegal drugs or addictive medications other than what is currently prescribed. AA, NA, and  Sponsor are excellent resources for support.       Keeping hands clean is one of the most important  steps we can take to avoid getting sick and spreading germs to others.  Please wash your hands frequently.     Primary Provider:  [facility] and [facility]  [place]   Thursday, May 17, 2018 @ 10:20 am  [phone]      DISCHARGE RESOURCES:  -SMART Recovery - self management for addiction recovery:  www.smartrecovery.org    -Pathways ~ A Health Crisis Resource & Support Center: 157.411.3543.  -[facility] [phone]   -[facility] Behavioral Intake 687-729-8320 or 886-075-9227.  -Crisis Intervention: 129.145.1669 or 535-846-8653 (TTY: 997.736.7098).  Call anytime.  -Suicide Awareness Voices of Education (SAVE) (www.save.org): 311-767-KKLP (2188)  -National Suicide Prevention Line (www.mentalhealthmn.org): 458-021-YLNH (3573)  -National [place] on Mental Illness (www.mn.rody.org): 111.871.6646 or 855-621-0967.  -Bjtd0txbm: text the word LIFE to 07829 for immediate support and crisis intervention  -Mental Health Consumer/Survivor Network of [place] (www.mhcsn.net): 588.464.5271 or 053-533-8787  -Mental Health Association of [place] (www.mentalhealth.org): 724.528.3895 or 647-289-2438     -Substance Abuse and Mental Health Services (www.samhsa.gov)  -Harm Reduction Coalition (www. Harmreduction.org)  -www.prescribetoprevent.org or http://prescribetoprevent.org/video  -Poison control 8-097-507-9316        Www.smartrecovery.org  SMART Recovery's 4-Point Program  helps people recover from all types of addictive behaviors, including: alcoholism, drug abuse, substance abuse, drug addiction, alcohol abuse, gambling addiction, cocaine addiction, and addiction to other substances and activities.  SMART Recovery (Self-Management And Recovery Training) is not a 12-step group, like Alcoholics Anonymous (AA) or Narcotics Anonymous (NA).  Teaches self-empowerment and self-reliance.  * Encourages individuals to recover and live satisfying lives.  * Teaches tools and techniques for self-directed  change.  * Meetings are educational and include open discussions.  * Advocates the appropriate use of prescribed medications and psychological treatments.  * Evolves as scientific knowledge of addiction recovery evolves.  Many of the modules are available online.    Sober Support Group Information:  AA/NA & Sponsor/Support  -Alcoholics Anonymous (www.alcoholics-anonymous.org): for local information 24 hours/day  -AA Intergroup service office in [place] (http://www.aastpaul.org/) [phone]  -AA Intergroup service office in [facility]: [phone]. (http://www.a[place].org/)  -Narcotics Anonymous (www.nam[place].org) (738) 273-1042   **Sober Fun Activities: www.soberSpill Incactivities.Penemarie K Murphy/cities//[facility] Connection ([facility])  [facility] connects people seeking recovery to resources that help foster and sustain long-term recovery.  Whether you are seeking resources for treatment, transportation, housing, job training, education, health care or other pathways to recovery, [facility] is a great place to start.    Phone: 443.675.3046.  www.[place]Synference (Great listing of all types of recovery and non-recovery related resources)    General Medication Instructions:   See your medication sheet(s) for instructions.   Take all medicines as directed.  Make no changes unless your doctor suggests them.   Go to all your doctor visits.  Be sure to have all your required lab tests. This way, your medicines can be refilled on time.  Do not use any drugs not prescribed by your provider.  AA/NA and Sponsors are excellent resources for support  Avoid alcohol.    Any follow up concerns:  Nursing questions call the [facility] [phone]  Medical Record call [phone]  Outpatient Behavioral Intake call [phone]  LP+ Wait List/Bed Availability call [phone] (Luis)    The entire treatment team has appreciated the opportunity to work with you Ayla.  We wish you the best in the future  "and with your lifelong recovery goals. Please bring this discharge folder with you to all follow up appointments.  It contains your lab results, diagnosis, medication list and discharge recommendations.    THANK YOU FOR CHOOSING THE [facility]     Pending Results     No orders found from 2018 to 2018.            Statement of Approval     Ordered          18 1015  I have reviewed and agree with all the recommendations and orders detailed in this document.  EFFECTIVE NOW     Approved and electronically signed by:  Shiva Bridges MD             Admission Information     Date & Time Provider Department Dept. Phone    4/10/2018 Shiva Bridges MD [facility] [phone]      Your Vitals Were     Blood Pressure Pulse Temperature Respirations Height Weight    134/92 77 96.7  F (35.9  C) (Oral) 16 1.676 m (5' 6\") 53.2 kg (117 lb 3 oz)    Pulse Oximetry BMI (Body Mass Index)                98% 18.91 kg/m2          MyChart Information     AnyCloud lets you send messages to your doctor, view your test results, renew your prescriptions, schedule appointments and more. To sign up, go to [email or web address] . Click on \"Log in\" on the left side of the screen, which will take you to the Welcome page. Then click on \"Sign up Now\" on the right side of the page.     You will be asked to enter the access code listed below, as well as some personal information. Please follow the directions to create your username and password.     Your access code is: [email or web address]  Expires: 7/10/2018  6:27 PM     Your access code will  in 90 days. If you need help or a new code, please call your [place] clinic or [phone].        Care EveryWhere ID     This is your Care EveryWhere ID. This could be used by other organizations to access your [place] medical records  [id number]        Equal Access to Services     NONI HERMAN AH: poonam Avilez " darryl tu ioanakenisha cortes ah. [facility] [phone].    ATENCIÓN: Si melanie jackson, tiene a nogueira disposición servicios gratuitos de asistencia lingüística. Llame al [phone].    We comply with applicable federal civil rights laws and [place] laws. We do not discriminate on the basis of race, color, national origin, age, disability, sex, sexual orientation, or gender identity.               Review of your medicines      START taking        Dose / Directions    buPROPion 150 MG 24 hr tablet   Commonly known as:  WELLBUTRIN XL   Used for:  Moderate episode of recurrent major depressive disorder (H)        Dose:  150 mg   Start taking on:  4/17/2018   Take 1 tablet (150 mg) by mouth daily   Quantity:  30 tablet   Refills:  1       calcium carbonate 500 MG chewable tablet   Commonly known as:  TUMS        Dose:  1 chew tab   Take 1 tablet (500 mg) by mouth 3 times daily as needed for heartburn   Quantity:  150 tablet   Refills:  0       hydrOXYzine 25 MG tablet   Commonly known as:  ATARAX   Used for:  Moderate episode of recurrent major depressive disorder (H)        Dose:  25 mg   Take 1 tablet (25 mg) by mouth every 4 hours as needed for anxiety   Quantity:  120 tablet   Refills:  1         CONTINUE these medicines which have NOT CHANGED        Dose / Directions    ADVIL -38 MG Tabs   Generic drug:  Ibuprofen-Diphenhydramine Cit        Dose:  1 tablet   Take 1 tablet by mouth nightly as needed (sleep)   Refills:  0       cetirizine 10 MG tablet   Commonly known as:  zyrTEC   Used for:  Chronic allergic rhinitis due to pollen, unspecified seasonality        Dose:  10 mg   Start taking on:  4/17/2018   Take 1 tablet (10 mg) by mouth daily   Quantity:  30 tablet   Refills:  1       fluticasone 50 MCG/ACT spray   Commonly known as:  FLONASE        Dose:  1-2 spray   Spray 1-2 sprays into both nostrils daily   Refills:  0            Where to get your medicines       These medications were sent to [facility] [place], [place] - [address]  [address] [facility] [address], [facility] [address]     Phone:  [phone]     buPROPion 150 MG 24 hr tablet    cetirizine 10 MG tablet    hydrOXYzine 25 MG tablet                Protect others around you: Learn how to safely use, store and throw away your medicines at www.disposemymeds.org.             Medication List: This is a list of all your medications and when to take them. Check marks below indicate your daily home schedule. Keep this list as a reference.      Medications           Morning Afternoon Evening Bedtime As Needed    ADVIL -38 MG Tabs   Take 1 tablet by mouth nightly as needed (sleep)   Generic drug:  Ibuprofen-Diphenhydramine Cit                                   buPROPion 150 MG 24 hr tablet   Commonly known as:  WELLBUTRIN XL   Take 1 tablet (150 mg) by mouth daily   Start taking on:  4/17/2018   Last time this was given:  150 mg on 4/16/2018  8:23 AM                                calcium carbonate 500 MG chewable tablet   Commonly known as:  TUMS   Take 1 tablet (500 mg) by mouth 3 times daily as needed for heartburn                                cetirizine 10 MG tablet   Commonly known as:  zyrTEC   Take 1 tablet (10 mg) by mouth daily   Start taking on:  4/17/2018   Last time this was given:  10 mg on 4/16/2018  8:24 AM                                fluticasone 50 MCG/ACT spray   Commonly known as:  FLONASE   Spray 1-2 sprays into both nostrils daily   Last time this was given:  2 sprays on 4/15/2018  9:56 AM                                   hydrOXYzine 25 MG tablet   Commonly known as:  ATARAX   Take 1 tablet (25 mg) by mouth every 4 hours as needed for anxiety   Last time this was given:  25 mg on 4/16/2018  8:23 AM

## 2018-04-10 NOTE — ED NOTES
ED to Behavioral Floor Handoff    SITUATION  Ayla Del Toro is a 48 year old female who speaks English and lives in a home with a spouse The patient arrived in the ED by private car from home with a complaint of Addiction Problem (has a bed on hold. seeking detox from etoh . last etoh today)  .The patient's current symptoms started/worsened 1 year(s) ago and during this time the symptoms have remained the same.   In the ED, pt was diagnosed with   Final diagnoses:   None        Initial vitals were: BP: (!) 127/91  Pulse: 90  Temp: 97.4  F (36.3  C)  Resp: 16  Weight: 53.2 kg (117 lb 3 oz)  SpO2: 99 %   --------  Is the patient diabetic? No   If yes, last blood glucose? --     If yes, was this treated in the ED? --  --------  Is the patient inebriated (ETOH) Yes or Impaired on other substances? No  MSSA done? Yes  Last MSSA score: --    Were withdrawal symptoms treated? No breathalizer 0.433  Does the patient have a seizure history? No. If yes, date of most recent seizure--  --------  Is the patient patient experiencing suicidal ideation? denies current or recent suicidal ideation     Homicidal ideation? denies current or recent homicidal ideation or behaviors.    Self-injurious behavior/urges? denies current or recent self injurious behavior or ideation.  ------  Was pt aggressive in the ED No  Was a code called No  Is the pt now cooperative? Yes  -------  Meds given in ED: Medications - No data to display   Family present during ED course? Yes  Family currently present? Yes    BACKGROUND  Does the patient have a cognitive impairment or developmental disability? No  Allergies:   Allergies   Allergen Reactions     Hydrocodone-Acetaminophen Nausea and Vomiting     Oxycodone Nausea and Vomiting     No Clinical Screening - See Comments Nausea and Vomiting   .   Social demographics are   Social History     Social History     Marital status:      Spouse name: Kp Espinoas     Number of children: 2     Years of  education: 20     Occupational History      Student     Social History Main Topics     Smoking status: Current Some Day Smoker     Packs/day: 0.25     Types: Cigarettes     Last attempt to quit: 5/18/2006     Smokeless tobacco: Never Used      Comment: recreationally     Alcohol use Yes     Drug use: No     Sexual activity: Yes     Partners: Male     Other Topics Concern     None     Social History Narrative        ASSESSMENT  Labs results   Labs Ordered and Resulted from Time of ED Arrival Up to the Time of Departure from the ED   ALCOHOL BREATH TEST POCT - Abnormal; Notable for the following:        Result Value    Alcohol Breath Test 0.433 (*)     All other components within normal limits   CBC WITH PLATELETS DIFFERENTIAL   COMPREHENSIVE METABOLIC PANEL   MAGNESIUM   DRUG ABUSE SCREEN 6 CHEM DEP URINE ([facility])      Imaging Studies: No results found for this or any previous visit (from the past 24 hour(s)).   Most recent vital signs BP (!) 127/91  Pulse 90  Temp 97.4  F (36.3  C) (Oral)  Resp 16  Wt 53.2 kg (117 lb 3 oz)  SpO2 99%  BMI 19.2 kg/m2   Abnormal labs/tests/findings requiring intervention:---   Pain control: good  Nausea control: good    RECOMMENDATION  Are any infection precautions needed (MRSA, VRE, etc.)? No If yes, what infection? --  ---  Does the patient have mobility issues? with stand-by assist. If yes, what device does the pt use? ---  ---  Is patient on 72 hour hold or commitment? No If on 72 hour hold, have hold and rights been given to patient? No  Are admitting orders written if after 10 p.m. ?N/A  Tasks needing to be completed:---     Ben Ramirez   [facility]-- [phone] [place] [facility]   [phone] [facility] ED

## 2018-04-10 NOTE — IP AVS SNAPSHOT
[facility]    [address] [place] [id number]    Phone:  [phone]                                       After Visit Summary   4/10/2018    Ayla Del Toro    MRN: [id number]           After Visit Summary Signature Page     I have received my discharge instructions, and my questions have been answered. I have discussed any challenges I see with this plan with the nurse or doctor.    ..........................................................................................................................................  Patient/Patient Representative Signature      ..........................................................................................................................................  Patient Representative Print Name and Relationship to Patient    ..................................................               ................................................  Date                                            Time    ..........................................................................................................................................  Reviewed by Signature/Title    ...................................................              ..............................................  Date                                                            Time

## 2018-04-10 NOTE — PHARMACY-ADMISSION MEDICATION HISTORY
Admission medication history interview status for the 4/10/2018 admission is complete. See Epic admission navigator for allergy information, pharmacy, prior to admission medications and immunization status.     Medication history interview sources: Patient, patient's family    Changes made to PTA medication list (reason)  Added: Zyrtec, Advil PM  Deleted: none  Changed: Flonase (directions per patient)    Additional medication history information (including reliability of information, actions taken by pharmacist): The patient and her family were moderately reliable historians. The patient uses several over the counter products but does not take any prescription medications.      Prior to Admission medications    Medication Sig Last Dose Taking? Auth Provider   fluticasone (FLONASE) 50 MCG/ACT spray Spray 1-2 sprays into both nostrils daily 4/10/2018 at AM Yes Unknown, Entered By History   cetirizine (ZYRTEC) 10 MG tablet Take 10 mg by mouth daily 4/10/2018 at AM Yes Unknown, Entered By History   Ibuprofen-Diphenhydramine Cit (ADVIL PM) 200-38 MG TABS Take 1 tablet by mouth nightly as needed (sleep)  Yes Unknown, Entered By History       Medication history completed by:  Jennifer Rodriguez, PharmD, BCPP  Behavioral ER Pharmacist  [phone]

## 2018-04-10 NOTE — ED PROVIDER NOTES
History     Chief Complaint   Patient presents with     Addiction Problem     has a bed on hold. seeking detox from etoh . last etoh today     HPI  Ayla Del Toro is a 48 year old female who presents seeking detox from alcohol.  Patient states she has a long history of alcoholism, and drinks approximately 1.75 L of wine daily.  Last drink was approximately 30 minutes ago.  She does experience alcohol withdrawal if she does not drink, but denies a history of DTs or seizures.  No nausea, vomiting or abdominal pain.  Denies any other medical or psychiatric symptoms presently.  She states she is planning on and motivated for chemical dependency treatment after detox.    I have reviewed the Medications, Allergies, Past Medical and Surgical History, and Social History in the Epic system.    Review of Systems  All other systems were reviewed and are negative    Physical Exam   BP: (!) 127/91  Pulse: 90  Temp: 97.4  F (36.3  C)  Resp: 16  Weight: 53.2 kg (117 lb 3 oz)  SpO2: 99 %      Physical Exam   Constitutional: She is oriented to person, place, and time. She appears well-developed and well-nourished.   HENT:   Head: Normocephalic and atraumatic.   Mouth/Throat: Oropharynx is clear and moist.   Eyes: EOM are normal. Pupils are equal, round, and reactive to light.   Neck: Normal range of motion. Neck supple. No tracheal deviation present. No thyromegaly present.   Cardiovascular: Normal rate, regular rhythm, normal heart sounds and intact distal pulses.  Exam reveals no gallop and no friction rub.    No murmur heard.  Pulmonary/Chest: Effort normal and breath sounds normal. She exhibits no tenderness.   Abdominal: Soft. Bowel sounds are normal. She exhibits no distension and no mass. There is no tenderness.   Musculoskeletal: She exhibits no edema or tenderness.   Neurological: She is alert and oriented to person, place, and time. No cranial nerve deficit. Coordination normal.   Skin: Skin is warm and dry. No rash  noted.   Psychiatric: She has a normal mood and affect. Her behavior is normal. Thought content normal. Her speech is slurred.   Nursing note and vitals reviewed.      ED Course     ED Course     Procedures             Critical Care time:  none             Labs Ordered and Resulted from Time of ED Arrival Up to the Time of Departure from the ED   CBC WITH PLATELETS DIFFERENTIAL - Abnormal; Notable for the following:        Result Value    RBC Count 3.73 (*)      (*)     MCH 35.4 (*)     All other components within normal limits   COMPREHENSIVE METABOLIC PANEL - Abnormal; Notable for the following:     Sodium 145 (*)     Chloride 112 (*)     Urea Nitrogen 6 (*)     Creatinine 0.42 (*)     Calcium 8.3 (*)     ALT 60 (*)     AST 88 (*)     All other components within normal limits   DRUG ABUSE SCREEN 6 CHEM DEP URINE ([facility]) - Abnormal; Notable for the following:     Cannabinoids Qual Urine Positive (*)     Ethanol Qual Urine Positive (*)     All other components within normal limits   ALCOHOL BREATH TEST POCT - Abnormal; Notable for the following:     Alcohol Breath Test 0.433 (*)     All other components within normal limits   MAGNESIUM            Assessments & Plan (with Medical Decision Making)   Patient presenting seeking detox from alcohol.  She has a long-standing history of severe alcohol abuse, and alcohol dependence.  Today she presents with alcohol level more than 5 times the legal limit.  She does have a history of nausea and shakes with alcohol withdrawal but no history of DTs or seizures.  She has no other medical or psychiatric complaints today.  Her labs reveal a very elevated alcohol level, no other significant acute abnormalities.  Patient appears to be a good candidate for voluntary admission to our detox unit.  She is likely to need assistance with detox due to impending withdrawal symptoms, and she is medically stable.    I have reviewed the nursing notes.    I have reviewed the findings,  diagnosis, plan and need for follow up with the patient.    New Prescriptions    No medications on file       Final diagnoses:   Uncomplicated alcohol dependence (H)       4/10/2018   [facility], [place], EMERGENCY DEPARTMENT     Jeanmarie Gay MD  04/10/18 [id number]

## 2018-04-10 NOTE — TELEPHONE ENCOUNTER
S: pt is a 49 yo female in the [facility] ED seeking detox from ETOH    B: Pt has been drinking a 1.75l of wine daily. Last use was a half an hour ago. Pt came in with a BA of .43. Pt is walking and talking and able to function at this level. Pt has no hx of dt's. Pt has some symptoms of withdrawal in the past. PT hs no prior medical or psych diagnosis. Pt is cooperative     A: Pt is voluntary, BA @.43 at time of admission.     R: 3a/Yuliana

## 2018-04-11 LAB
ALBUMIN SERPL-MCNC: 4 G/DL (ref 3.4–5)
ALP SERPL-CCNC: 66 U/L (ref 40–150)
ALT SERPL W P-5'-P-CCNC: 55 U/L (ref 0–50)
ANION GAP SERPL CALCULATED.3IONS-SCNC: 9 MMOL/L (ref 3–14)
AST SERPL W P-5'-P-CCNC: 75 U/L (ref 0–45)
BILIRUB SERPL-MCNC: 0.9 MG/DL (ref 0.2–1.3)
BUN SERPL-MCNC: 9 MG/DL (ref 7–30)
C DIFF TOX B STL QL: NEGATIVE
CALCIUM SERPL-MCNC: 9.2 MG/DL (ref 8.5–10.1)
CHLORIDE SERPL-SCNC: 103 MMOL/L (ref 94–109)
CO2 SERPL-SCNC: 26 MMOL/L (ref 20–32)
CREAT SERPL-MCNC: 0.57 MG/DL (ref 0.52–1.04)
ERYTHROCYTE [DISTWIDTH] IN BLOOD BY AUTOMATED COUNT: 12.4 % (ref 10–15)
GFR SERPL CREATININE-BSD FRML MDRD: >90 ML/MIN/1.7M2
GGT SERPL-CCNC: 152 U/L (ref 0–40)
GLUCOSE SERPL-MCNC: 120 MG/DL (ref 70–99)
HCT VFR BLD AUTO: 39.7 % (ref 35–47)
HGB BLD-MCNC: 13.3 G/DL (ref 11.7–15.7)
MCH RBC QN AUTO: 35.9 PG (ref 26.5–33)
MCHC RBC AUTO-ENTMCNC: 33.5 G/DL (ref 31.5–36.5)
MCV RBC AUTO: 107 FL (ref 78–100)
PLATELET # BLD AUTO: 247 10E9/L (ref 150–450)
POTASSIUM SERPL-SCNC: 4.7 MMOL/L (ref 3.4–5.3)
PROT SERPL-MCNC: 7.6 G/DL (ref 6.8–8.8)
RBC # BLD AUTO: 3.7 10E12/L (ref 3.8–5.2)
SODIUM SERPL-SCNC: 138 MMOL/L (ref 133–144)
SPECIMEN SOURCE: NORMAL
WBC # BLD AUTO: 5 10E9/L (ref 4–11)

## 2018-04-11 PROCEDURE — 99222 1ST HOSP IP/OBS MODERATE 55: CPT | Mod: AI | Performed by: PSYCHIATRY & NEUROLOGY

## 2018-04-11 PROCEDURE — 99207 ZZC CONSULT E&M CHANGED TO INITIAL LEVEL: CPT | Performed by: PHYSICIAN ASSISTANT

## 2018-04-11 PROCEDURE — 85027 COMPLETE CBC AUTOMATED: CPT | Performed by: PSYCHIATRY & NEUROLOGY

## 2018-04-11 PROCEDURE — 82977 ASSAY OF GGT: CPT | Performed by: PSYCHIATRY & NEUROLOGY

## 2018-04-11 PROCEDURE — 25000132 ZZH RX MED GY IP 250 OP 250 PS 637: Performed by: PSYCHIATRY & NEUROLOGY

## 2018-04-11 PROCEDURE — 87493 C DIFF AMPLIFIED PROBE: CPT | Performed by: PHYSICIAN ASSISTANT

## 2018-04-11 PROCEDURE — 80053 COMPREHEN METABOLIC PANEL: CPT | Performed by: PSYCHIATRY & NEUROLOGY

## 2018-04-11 PROCEDURE — 99221 1ST HOSP IP/OBS SF/LOW 40: CPT | Performed by: PHYSICIAN ASSISTANT

## 2018-04-11 PROCEDURE — 12800012 ZZH R&B CD MH INTERMEDIATE ADULT

## 2018-04-11 PROCEDURE — 36415 COLL VENOUS BLD VENIPUNCTURE: CPT | Performed by: PSYCHIATRY & NEUROLOGY

## 2018-04-11 RX ORDER — LOPERAMIDE HCL 2 MG
2 CAPSULE ORAL 4 TIMES DAILY PRN
Status: DISCONTINUED | OUTPATIENT
Start: 2018-04-11 | End: 2018-04-16 | Stop reason: HOSPADM

## 2018-04-11 RX ORDER — CALCIUM CARBONATE 500 MG/1
500 TABLET, CHEWABLE ORAL 3 TIMES DAILY PRN
Status: DISCONTINUED | OUTPATIENT
Start: 2018-04-11 | End: 2018-04-16 | Stop reason: HOSPADM

## 2018-04-11 RX ADMIN — Medication 100 MG: at 08:30

## 2018-04-11 RX ADMIN — DIAZEPAM 10 MG: 5 TABLET ORAL at 12:44

## 2018-04-11 RX ADMIN — DIAZEPAM 10 MG: 5 TABLET ORAL at 20:18

## 2018-04-11 RX ADMIN — FOLIC ACID 1 MG: 1 TABLET ORAL at 08:29

## 2018-04-11 RX ADMIN — DIAZEPAM 10 MG: 5 TABLET ORAL at 16:21

## 2018-04-11 RX ADMIN — DIAZEPAM 10 MG: 5 TABLET ORAL at 08:29

## 2018-04-11 RX ADMIN — MULTIPLE VITAMINS W/ MINERALS TAB 1 TABLET: TAB at 08:30

## 2018-04-11 RX ADMIN — TRAZODONE HYDROCHLORIDE 50 MG: 50 TABLET ORAL at 01:28

## 2018-04-11 RX ADMIN — CETIRIZINE HYDROCHLORIDE 10 MG: 10 TABLET, FILM COATED ORAL at 08:30

## 2018-04-11 ASSESSMENT — ACTIVITIES OF DAILY LIVING (ADL)
LAUNDRY: WITH SUPERVISION
DRESS: INDEPENDENT
ORAL_HYGIENE: INDEPENDENT
GROOMING: INDEPENDENT
GROOMING: INDEPENDENT
LAUNDRY: WITH SUPERVISION
DRESS: INDEPENDENT
ORAL_HYGIENE: INDEPENDENT

## 2018-04-11 NOTE — PROGRESS NOTES
04/10/18 1925   Patient Belongings   Did you bring any home meds/supplements to the hospital?  Yes   Disposition of meds  Sent to security/pharmacy per site process   Patient Belongings other (see comments)   Disposition of Belongings Kept with patient;Locker;Sent to security per site process;Other (see comment)   Belongings Search Yes   Clothing Search Yes   Second Staff Lizeth    General Info Comment see note   Storage bin: water bottle, backpack with toiletry case, hat, folder, notebook, sewing needles and yarn, blue penguin shall. Cream bag with toiletries, black travel pouch. Plastic bag with scarf, hat, jacket, and restricted clothing. Suitcase with restricted clothing, alarm clock, yarn and sewing needles. Sharps: lighter, cigs  Locked Drawer: cell phone,   Security: [id number]: VANESSA salmon, $50.00 cash  Security: [id number]: MEDS  A               Admission:  I am responsible for any personal items that are not sent to the safe or pharmacy.  [facility] is not responsible for loss, theft or damage of any property in my possession.    Signature:  _________________________________ Date: _______  Time: _____                                              Staff Signature:  ____________________________ Date: ________  Time: _____      2nd Staff person, if patient is unable/unwilling to sign:    Signature: ________________________________ Date: ________  Time: _____     Discharge:  [facility] has returned all of my personal belongings:    Signature: _________________________________ Date: ________  Time: _____                                          Staff Signature:  ____________________________ Date: ________  Time: _____

## 2018-04-11 NOTE — PROGRESS NOTES
The patient has been in the milieu and has participated in unit activities.  She admits to being depressed, but she denies that she has any intent on self harm.  She is hoping that she can go to [facility] upon discharge from detox.  She has a flattened affect.  The patient's C Dif test came back negative.  Her appetite is improving.

## 2018-04-11 NOTE — H&P
"Admitted:     04/10/2018      DATE OF ADMISSION:   04/10/2018      CHIEF COMPLAINT:  \"Alcohol.\"      HISTORY OF PRESENT ILLNESS:  The patient is a 48-year-old white female with a history of alcohol use disorder and depression who was admitted after drinking up to 1.75 L of wine daily.  Blood alcohol level was 0.43 on admission.  Says she is feeling a little shaky but denies any history of seizures or DTs.  Mood is scared.  Says she sleeps okay, although had a hard time getting back to sleep last night and did get some trazodone.  Has not been eating well due to the alcohol but is getting some appetite back.  Denies any suicidal or homicidal ideation.  Denies any auditory or visual hallucinations.      PAST PSYCHIATRIC HISTORY:  The patient does have a history of suicide attempt in high school.  Has had some suicidal ideation on occasion.  Has been on antidepressants in the past including Paxil and Wellbutrin, but not recently.  Currently just on Benadryl 25 mg each day at bedtime p.r.n. insomnia.      ALLERGIES:  OXYCODONE, HYDROCODONE/ACETAMINOPHEN.      PAST MEDICAL HISTORY:  The patient denies any history of seizures, head injury with loss of consciousness.      REVIEW OF SYSTEMS:  A 10-point systems reviewed and all were negative except those mentioned in the HPI.      SUBSTANCE HISTORY:  The patient's substance of choice is alcohol.  Has never been through detoxification before.  Denies any withdrawal seizures or DTs.  Smokes occasionally.      FAMILY PSYCHIATRIC HISTORY:  The patient says she is unsure as she does not talk to her biological family.      SOCIAL HISTORY:  The patient is , has 2 children.      PHYSICAL EXAMINATION:  Please see the physical exam from the Medical team.   VITAL SIGNS:  Blood pressure 122/82, pulse 89, respirations 16, temperature 98.3.      MENTAL STATUS EXAMINATION:  ESTEPHANIA:  The patient is a 48-year-old white female, cooperative, pleasant, good eye contact.  Speech:  Regular " "rate, rhythm, volume and tone.  Mood is \"scared.\"  Affect is fairly full.  Thought process is goal directed.  Thought content:  Denies suicidal or homicidal ideation.  Denies auditory or visual hallucinations.  No loosening of associations noted.  Sensorium is clear.  Cognition:  Oriented x 3.  Recent and remote memory appear to be fair.  Attention and concentration:  fair.  Language:  No deficits noted.  Fund of knowledge:  Appropriate.  Muscle strength and tone: No deficits noted.  Gait and station:  No deficits noted.  Insight is fair.  Judgment is fair to good.      DIAGNOSES:   Axis I:     1.  Alcohol use disorder, severe dependence.   2.  History of major depressive disorder.  Rule out alcohol induced depression.   3.  Nicotine use disorder.   Axis II:  Deferred.   Axis III:  See past medical history.      PLAN:   1.  The patient was voluntarily admitted to station 3A.  She was encouraged to engage in groups and with staff on unit.   2.  The patient will be detoxified from alcohol using MSSA protocol on Valium.   3.  The patient is agreeable to enter into inpatient chemical dependency treatment and is hopeful for [facility].         JETHRO BORGES MD             D: 2018   T: 2018   MT: THA      Name:     DORIS DOBSON   MRN:      [id number]        Account:      [id number]   :      1970        Admitted:     04/10/2018                   Document: [id number]      "

## 2018-04-11 NOTE — PLAN OF CARE
Behavioral Team Discussion: (4/11/2018)    Continued Stay Criteria/Rationale: Patient admitted for Chemical Use Issues.  Plan: The following services will be provided to the patient; psychiatric assessment, medication management, therapeutic milieu, individual and group support, and skills groups.   Participants: 3A Provider: Dr. Shiva Bridges MD; 3A RN's: Monique Fowler, RN, Chang Marc, RN, Luis Burnett, RN and Brenda Michelle, RN; 3A CM's: Ryann Mosse, Nayana Cain  and Espinoza Stanley.  Summary/Recommendation: Providers will assess today for treatment recommendations, discharge planning, and aftercare plans. CM will meet with pt for discharge planning and update assessment for [facility].   Medical/Physical: Deferred (see medical notes).  Precautions:   Behavioral Orders   Procedures     Code 1 - Restrict to Unit     Routine Programming     As clinically indicated     Status 15     Every 15 minutes.     Withdrawal precautions     Rationale for change in precautions or plan: N/A  Progress: Improving.
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
## 2018-04-11 NOTE — TELEPHONE ENCOUNTER
4/11/2018 Please put patient on the women's, mixed community priority LP list.    Patient's insurance is [facility]    Best tel contact number is [phone], patient is on 3A.  Best secondary tel contact number is [phone].    Giacomo CAGE, [facility]

## 2018-04-11 NOTE — CONSULTS
"  Internal Medicine Initial Visit    Ayla Del Toro MRN# [id number]   Age: 48 year old YOB: 1970   Date of Admission:       4/10/2018     Reason for consult: Alcohol Abuse       Requesting physician Dr. Little      SUBJECTIVE   HPI:   Ayla Del Toro is a 48 year old female with a history of alcohol abuse and allergic rhinitis who was admitted to  for alcohol detoxification. Prior to admission, she was drinking 1.75 liters daily. No history of withdrawal seizures or DTs. Current withdrawal symptoms include anxiety, weakness and tremors. Aside from withdrawal, she endorses intermittent chest pain which is not currently present. Her chest pain can occur anywhere from daily to a few times a week. She also notes minimal dyspnea on exertion which she attributes to being \"out of shape.\" No SOB at rest or BLE edema. She has never been evaluated for these symptoms. She endorses intermittent left-sided abdominal pain and acid reflux symptoms, neither of which are currently present. She has chronic diarrhea in the setting of alcohol abuse, poor oral intake, and recent antibiotic use in the past month for UTI. No current urinary symptoms. She reports her feet are \"tender\" upon awakening at times.     Past Medical History:     Past Medical History:   Diagnosis Date     Alcohol abuse      Allergic rhinitis       Reviewed & updated in [place].     Past Surgical History:      Past Surgical History:   Procedure Laterality Date     C TOTAL ABDOM HYSTERECTOMY       HC REMOVE TONSILS/ADENOIDS,<11 Y/O       HC TOOTH EXTRACTION W/FORCEP        Reviewed & updated in Epic.     Medications prior to admission:     Prior to Admission Medications   Prescriptions Last Dose Informant Patient Reported? Taking?   Ibuprofen-Diphenhydramine Cit (ADVIL PM) 200-38 MG TABS Unknown at Unknown time  Yes No   Sig: Take 1 tablet by mouth nightly as needed (sleep)   cetirizine (ZYRTEC) 10 MG tablet 4/10/2018 at AM  Yes Yes   Sig: Take 10 mg by " "mouth daily   fluticasone (FLONASE) 50 MCG/ACT spray 4/10/2018 at AM  Yes Yes   Sig: Spray 1-2 sprays into both nostrils daily      Facility-Administered Medications: None         Allergies:     Allergies   Allergen Reactions     Hydrocodone-Acetaminophen Nausea and Vomiting     Oxycodone Nausea and Vomiting         Social History:   Tobacco use consists of 2 cigarettes daily.  Alcohol use as detailed in HPI.  Occasional marijuana use. Denies any additional illicit drug use.     Family History:     Family History   Problem Relation Age of Onset     Depression Mother      CANCER Mother      Ductal carcinoma in situ (DCIS)     CANCER Father      Pseudomyxoma peritonei      Depression Maternal Aunt      Reviewed & updated in Epic.     Review of Systems:   Ten point review of systems negative except as stated above in History of Present Illness.    OBJECTIVE   Physical Exam:   Blood pressure 138/84, pulse 95, temperature 98.3  F (36.8  C), temperature source Oral, resp. rate 16, height 1.676 m (5' 6\"), weight 53.2 kg (117 lb 3 oz), SpO2 98 %.  General: Awake. Non-toxic appearing. NAD.  HEENT: NC/AT. Anicteric sclera. Eyes symmetric and free of discharge bilaterally. Mucous membranes moist.  Cardiovascular: RRR. S1,S2. No murmurs appreciated.  Lungs: Normal respiratory effort on RA. Lungs CTAB without wheezes, rales, or rhonchi.  Abdomen: Soft, non-tender, and nondistended with bowel sounds present.  Extremities: Warm & well perfused. No peripheral edema.  Skin: No rashes, jaundice, or lesions on exposed areas of skin.  Neurologic: A&O X 3. Answers questions appropriately. Moves all extremities symmetrically.     Laboratory Data:     CMP  Recent Labs  Lab 04/11/18  0841 04/10/18  1541    145*   POTASSIUM 4.7 3.9   CHLORIDE 103 112*   CO2 26 23   ANIONGAP 9 10   * 88   BUN 9 6*   CR 0.57 0.42*   GFRESTIMATED >90 >90   GFRESTBLACK >90 >90   ARVIND 9.2 8.3*   MAG  --  2.1   PROTTOTAL 7.6 7.7   ALBUMIN 4.0 3.8 "   BILITOTAL 0.9 0.2   ALKPHOS 66 67   AST 75* 88*   ALT 55* 60*       CBC  Recent Labs  Lab 04/11/18  0841 04/10/18  1541   WBC 5.0 5.4   RBC 3.70* 3.73*   HGB 13.3 13.2   HCT 39.7 39.7   * 106*   MCH 35.9* 35.4*   MCHC 33.5 33.2   RDW 12.4 12.4    243          Assessment and Plan/Recommendations:   Ayla Del Toro is a 48 year old female with a history of alcohol abuse and allergic rhinitis who was admitted to  for alcohol detoxification.    Alcohol Abuse and Withdrawal - Management per Psychiatry.    Intermittent Chest Pain and Minimal JEAN - Symptoms not currently present. Unclear etiology with broad ddx. No prior evaluation, EKGs, or Echocardiograms. Remains hemodynamically stable.   - Continue to monitor. Notify Medicine and obtain troponin/EKG if symptoms occur this admission.  - Outpatient follow up with PCP for further evaluation including possible stress test    Chronic Diarrhea - Likely secondary to alcohol abuse. Check C.diff PCR given recent antibiotic use. If negative, can start PRN Imodium. If persists after withdrawal and alcohol cessation, follow up with PCP vs luminal GI for further evaluation.    Mild Transaminitis - Likely secondary to ETOH abuse. Improving with cessation. ALT 55 (60), AST 75 (88) on 4/11. TBili and Alk Phos wnl. Asymptomatic.  - Repeat CMP per PCP within 1 month of discharge    Allergic Rhinitis - Stable. Continue PTA Zyrtec and Flonase.    GERD - Intermittent symptoms PTA, none at present. Declined initiation of PPI or H2 blocker. Start Tums TID PRN.    Medicine will follow up on C.diff PCR.     Luz Huynh PA-C  Hospitalist Service  [phone]

## 2018-04-11 NOTE — PROGRESS NOTES
Brief Medicine Note    Ayla Del Toro is a 48 year old female with a history of alcohol abuse and allergic rhinitis who was admitted to  for alcohol detoxification. Medicine following for diarrhea.     Chronic Diarrhea - Likely secondary to alcohol abuse. C.diff PCR negative on 4/11.  - Start Imodium PRN  - If diarrhea persists after withdrawal and alcohol cessation, follow up with PCP vs luminal GI for further evaluation.    Medicine will sign off.    Luz Huynh PA-C  Hospitalist Service  [phone]

## 2018-04-11 NOTE — PROGRESS NOTES
Case Management Note  4/11/2018    Writer met with pt to initiate discharge planning. Pt reports she [facility]. Writer informed her, most of her paperwork had been completed. Writer explained her assessment will need to be updated to document her alcohol use, since the assessment was completed. Pt verbalized understanding. Writer faxed pt's assessment to [facility] requesting authorization for [facility]. Case management to continue.    Writer met with pt to update her comprehensive assessment dated 3/19/18.    Gabyr received a phone call from Matilde at [facility]. She reports pt has already been approved for admission to [facility]. Pt was approved on 3/21/18. This approval is good for 30 days through 4/21/18. In basket sent to Intake requesting pt be placed on the [facility] Wait List. Case management complete.    Espinoza Stanley MA, [facility]

## 2018-04-11 NOTE — PROVIDER NOTIFICATION
New Admission:  Patient arrives to the unit intoxicated. She is alert and steady on her feet but, intoxicated. She came into the ED with alcohol level: .43 She has a safe and steady gait. She needs multiple cues and reminders from staff. It is difficult to keep her on task. She was brought in by her  and she signed a PARISH for him. She also signed in voluntarily seeking detox. I am unsure how reliable she is with the information that she provides at this time. Notified MD and received admission orders at this time. She states that she has not done detox or treatment previously. She would like to go to treatment following detox. She was able to eat 100% of wallace meal tray, up and independent on the unit. Monitored closely by staff due to her intoxication level.

## 2018-04-11 NOTE — PLAN OF CARE
Problem: Substance Withdrawal  Goal: Substance Withdrawal  Signs and symptoms of listed problems will be absent or manageable. 1. Detoxification from Alcohol using the [facility] valium protocol  2. Patient will complete assessment paperwork  3.  Patient will meet with  to discuss treatment and discharge planning  4. Physical examination and Lab evaluation by MD  5. Patients oral intake will be greater than 75 % of meals to meet estimated needs  6. Adequate fluid intake    Outcome: No Change  Pt being monitored for alcohol withdrawal. Pt medicated x 2 with valium 10 mg. Pt feels valium is helping reduce her withdrawal symptoms. Pt out on unit attending unit program in the am. Resting in room this afternoon. Pt assisted with phone number for her to call in for her unemployment. Pt eyes are puffy.Staff are to notify medical if pt is having chest pain. See order for prn tums. Pt also placed on enteric precautions. Stool specimen sent for c diff testing. Pt states she does not remember having a solid stool in a long time.  Left message for Infection control regarding this. Will be moving patient to a private room. Appetite fair. Fluids encouraged.

## 2018-04-12 PROCEDURE — H2035 A/D TX PROGRAM, PER HOUR: HCPCS | Mod: HQ

## 2018-04-12 PROCEDURE — 25000132 ZZH RX MED GY IP 250 OP 250 PS 637: Performed by: PSYCHIATRY & NEUROLOGY

## 2018-04-12 PROCEDURE — 12800012 ZZH R&B CD MH INTERMEDIATE ADULT

## 2018-04-12 RX ADMIN — MULTIPLE VITAMINS W/ MINERALS TAB 1 TABLET: TAB at 08:07

## 2018-04-12 RX ADMIN — DIAZEPAM 10 MG: 5 TABLET ORAL at 12:43

## 2018-04-12 RX ADMIN — DIAZEPAM 5 MG: 5 TABLET ORAL at 20:20

## 2018-04-12 RX ADMIN — Medication 100 MG: at 08:06

## 2018-04-12 RX ADMIN — DIAZEPAM 10 MG: 5 TABLET ORAL at 08:06

## 2018-04-12 RX ADMIN — FOLIC ACID 1 MG: 1 TABLET ORAL at 08:06

## 2018-04-12 RX ADMIN — HYDROXYZINE HYDROCHLORIDE 25 MG: 25 TABLET, FILM COATED ORAL at 20:20

## 2018-04-12 RX ADMIN — DIAZEPAM 10 MG: 5 TABLET ORAL at 16:15

## 2018-04-12 RX ADMIN — CETIRIZINE HYDROCHLORIDE 10 MG: 10 TABLET, FILM COATED ORAL at 08:07

## 2018-04-12 ASSESSMENT — ACTIVITIES OF DAILY LIVING (ADL)
ORAL_HYGIENE: INDEPENDENT
GROOMING: INDEPENDENT
LAUNDRY: WITH SUPERVISION
LAUNDRY: WITH SUPERVISION
DRESS: INDEPENDENT
DRESS: STREET CLOTHES;INDEPENDENT
ORAL_HYGIENE: INDEPENDENT
GROOMING: SHOWER;INDEPENDENT

## 2018-04-12 NOTE — TELEPHONE ENCOUNTER
April 12, 2018    Referral Medical Screening:  Per RN to RN review and EPIC review    Mental health concerns /suicidal ideation?  None      Based on above assessment: Client meets medical criteria for admission to [facility] and Patient call routed for further review
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
## 2018-04-12 NOTE — PLAN OF CARE
"Problem: Substance Withdrawal  Goal: Substance Withdrawal  Signs and symptoms of listed problems will be absent or manageable. 1. Detoxification from Alcohol using the [facility] valium protocol  2. Patient will complete assessment paperwork  3.  Patient will meet with  to discuss treatment and discharge planning  4. Physical examination and Lab evaluation by MD  5. Patients oral intake will be greater than 75 % of meals to meet estimated needs  6. Adequate fluid intake     Outcome: No Change  Pt medicated x 2 with valium 10 mg. Pt continues to report internal shakiness and anxiety. Pt reports feeling depressed. Rates her depression level a 6 on a 0-10 severe scale. Pt reports feeling Hopeless stating \"I have nothing to look forward to.\" Denies SI or plan. States she would not attempt suicide because \"I would screw that up as I always screw things up.\" Pt reports feeling safe here on the unit. Pt has two children ages 15 and 17 years old. She has not had contact with her children since August of last year. Pt states her ex- reported her to CPS. 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 Pt reports having been on antidepressants previously but has concerns about sexual side effects.    Enteric precautions discontinued. Pt out on unit attending unit programming. States she is very emotional and tearful because she no longer is drinking. Discussed healthy coping skills. Mood is labile. Ayla struggles with shame and does not see alcoholism as a disease.       "

## 2018-04-13 PROCEDURE — 99232 SBSQ HOSP IP/OBS MODERATE 35: CPT | Performed by: PSYCHIATRY & NEUROLOGY

## 2018-04-13 PROCEDURE — 25000132 ZZH RX MED GY IP 250 OP 250 PS 637: Performed by: PSYCHIATRY & NEUROLOGY

## 2018-04-13 PROCEDURE — 12800012 ZZH R&B CD MH INTERMEDIATE ADULT

## 2018-04-13 RX ADMIN — MULTIPLE VITAMINS W/ MINERALS TAB 1 TABLET: TAB at 08:02

## 2018-04-13 RX ADMIN — TRAZODONE HYDROCHLORIDE 50 MG: 50 TABLET ORAL at 01:42

## 2018-04-13 RX ADMIN — CETIRIZINE HYDROCHLORIDE 10 MG: 10 TABLET, FILM COATED ORAL at 08:02

## 2018-04-13 RX ADMIN — HYDROXYZINE HYDROCHLORIDE 25 MG: 25 TABLET, FILM COATED ORAL at 12:41

## 2018-04-13 RX ADMIN — TRAZODONE HYDROCHLORIDE 50 MG: 50 TABLET ORAL at 21:31

## 2018-04-13 RX ADMIN — HYDROXYZINE HYDROCHLORIDE 25 MG: 25 TABLET, FILM COATED ORAL at 16:12

## 2018-04-13 RX ADMIN — HYDROXYZINE HYDROCHLORIDE 25 MG: 25 TABLET, FILM COATED ORAL at 21:31

## 2018-04-13 RX ADMIN — FOLIC ACID 1 MG: 1 TABLET ORAL at 08:02

## 2018-04-13 ASSESSMENT — ACTIVITIES OF DAILY LIVING (ADL)
ORAL_HYGIENE: INDEPENDENT
DRESS: INDEPENDENT
LAUNDRY: WITH SUPERVISION
GROOMING: INDEPENDENT

## 2018-04-13 NOTE — DISCHARGE INSTRUCTIONS
Behavioral Discharge Planning and Instructions  THANK YOU FOR CHOOSING THE [facility]  3A  [phone]    Summary: You were admitted to Station 3A on 4/10/18 for detoxification from alcohol.  A medical exam was performed that included lab work. You have met with a  and been referred to [facility].  Please take care and make your recovery a priority Ayla! It was a pleasure working with you and the treatment team wishes you the very best in your recovery!  ~Espinoza    Recommendation:  Residential Treatment, psychotherapy, sober support group engagement and active work with a sponsor or  through [facility] Connection.    Main Diagnosis: Per Dr. Shiva Bridges MD;  DIAGNOSES:   Axis I:     1.  Alcohol use disorder, severe dependence.   2.  History of major depressive disorder.  Rule out alcohol induced depression.   3.  Nicotine use disorder.   Axis II:  Deferred.   Axis III:  See past medical history.       Major Treatments, Procedures and Findings:  You were detoxed from alcohol with the Modified Selective Severity Protocol using Valium. You have met with a  to develop a treatment plan for discharge.  You have had labs drawn and a copy of those labs will be sent home with you.  Please bring your lab results with to your follow up doctor appointment.    Symptoms to Report:  If you experience more anxiety, confusion, sleeplessness, deep sadness or thoughts of suicide, notify your treatment team or notify your primary care physician. IF ANY OF THE SYMPTOMS YOU ARE EXPERIENCING ARE A MEDICAL EMERGENCY CALL 911 IMMEDIATELY.     Lifestyle Adjustment: Adjust your lifestyle to get enough sleep, relaxation, exercise and  good nutrition. Continue to develop healthy coping skills to decrease stress and promote a sober living environment. Do not use alcohol, illegal drugs or addictive medications other than what is currently prescribed. AA, NA, and  Sponsor are  excellent resources for support.       Keeping hands clean is one of the most important steps we can take to avoid getting sick and spreading germs to others.  Please wash your hands frequently.     Primary Provider:  [facility]  Davin SEMAJ  Thursday, May 17, 2018 @ 10:20 am  [phone]      DISCHARGE RESOURCES:  -SMART Recovery - self management for addiction recovery:  www.smartrecOceeny.org    -Pathways ~ [facility] Crisis Resource & Support Center: 432.340.2949.  -[facility] [phone]   -[facility] Behavioral Intake [phone] or [phone].  -Crisis Intervention: 935.714.9058 or 047-176-7653 (TTY: 404.981.3374).  Call anytime.  -Suicide Awareness Voices of Education (SAVE) (www.save.org): 438-413-WVHY (4622)  -National Suicide Prevention Line (www.mentalhealthmn.org): 965-531-BUDL (9989)  -National [place] on Mental Illness (www.mn.rody.org): 438.195.3361 or 635-408-8978.  -Fgdb7drkn: text the word LIFE to 53586 for immediate support and crisis intervention  -Mental Health Consumer/Survivor Network of [place] (www.mhcsn.net): 784.771.9177 or 080-707-6185  -Mental Health Association of [place] (www.mentalhealth.org): 780.531.5175 or 128-011-7049     -Substance Abuse and Mental Health Services (www.samhsa.gov)  -Harm Reduction Coalition (www. Harmreduction.org)  -www.prescribetoprevent.org or http://prescribetoprevent.org/video  -Poison control 1-542.465.4689        Www.AubreyrecGaneselo.com.org  SMART Recovery's 4-Point Program  helps people recover from all types of addictive behaviors, including: alcoholism, drug abuse, substance abuse, drug addiction, alcohol abuse, gambling addiction, cocaine addiction, and addiction to other substances and activities.  SMART Recovery (Self-Management And Recovery Training) is not a 12-step group, like Alcoholics Anonymous (AA) or Narcotics Anonymous (NA).  Teaches self-empowerment and self-reliance.  * Encourages individuals to  recover and live satisfying lives.  * Teaches tools and techniques for self-directed change.  * Meetings are educational and include open discussions.  * Advocates the appropriate use of prescribed medications and psychological treatments.  * Evolves as scientific knowledge of addiction recovery evolves.  Many of the modules are available online.    Sober Support Group Information:  AA/NA & Sponsor/Support  -Alcoholics Anonymous (www.alcoholics-anonymous.org): for local information 24 hours/day  -AA Intergroup service office in [place] (http://www.aastpaul.org/) [phone]  -AA Intergroup service office in [facility]: [phone]. (http://www.aaNanoInkneaThinkr.org/)  -Narcotics Anonymous (www.naminn[place].org) (369) 101-4541   **Sober Fun Activities: www.soberMinitradeactivities.[employer]/[facility]//[facility] Connection ([facility])  [facility] connects people seeking recovery to resources that help foster and sustain long-term recovery.  Whether you are seeking resources for treatment, transportation, housing, job training, education, health care or other pathways to recovery, [facility] is a great place to start.    Phone: 692.928.7132.  www.minnes[place]Widbook.[employer] (Great listing of all types of recovery and non-recovery related resources)    General Medication Instructions:   See your medication sheet(s) for instructions.   Take all medicines as directed.  Make no changes unless your doctor suggests them.   Go to all your doctor visits.  Be sure to have all your required lab tests. This way, your medicines can be refilled on time.  Do not use any drugs not prescribed by your provider.  AA/NA and Sponsors are excellent resources for support  Avoid alcohol.    Any follow up concerns:  Nursing questions call the Unit [facility] [phone]  Medical Record call [phone]  Outpatient Behavioral Intake call [phone]  [facility] Wait List/Bed Availability call [phone] (Luis)    The entire treatment team has  appreciated the opportunity to work with you Ayla.  We wish you the best in the future and with your lifelong recovery goals. Please bring this discharge folder with you to all follow up appointments.  It contains your lab results, diagnosis, medication list and discharge recommendations.    THANK YOU FOR CHOOSING THE [facility]

## 2018-04-13 NOTE — PROGRESS NOTES
"[facility]   Psychiatric Progress Note        Interim History:   The patient's care was discussed with the treatment team during the daily team meeting and/or staff's chart notes were reviewed.  Staff report patient has still been withdrawing.     The patient reports that she is better. Is hopeful to go to [facility] soon. Mood is sad. Discussed options for antidepressant and patient concerned over sexual side effects. Has been on Wellbutrin before. Will start this tomorrow after patient fully out of detox. Sleeping and eating well. Denies SI.          Medications:       folic acid  1 mg Oral Daily     multivitamin, therapeutic with minerals  1 tablet Oral Daily     cetirizine  10 mg Oral Daily          Allergies:     Allergies   Allergen Reactions     Hydrocodone-Acetaminophen Nausea and Vomiting     Oxycodone Nausea and Vomiting          Labs:   No results found for this or any previous visit (from the past 24 hour(s)).       Psychiatric Examination:     /72  Pulse 75  Temp 96.9  F (36.1  C)  Resp 16  Ht 1.676 m (5' 6\")  Wt 53.2 kg (117 lb 3 oz)  SpO2 98%  BMI 18.91 kg/m2  Weight is 117 lbs 3 oz  Body mass index is 18.91 kg/(m^2).  Orthostatic Vitals     None            Appearance: awake, alert and adequately groomed  Attitude:  cooperative  Eye Contact:  good  Mood:  sad   Affect:  mood congruent  Speech:  clear, coherent  Psychomotor Behavior:  no evidence of tardive dyskinesia, dystonia, or tics  Thought Process:  linear  Associations:  no loose associations  Thought Content:  no evidence of suicidal ideation or homicidal ideation and no evidence of psychotic thought  Insight:  fair  Judgement:  fair  Oriented to:  time, person, and place  Attention Span and Concentration:  intact  Recent and Remote Memory:  fair    Clinical Global Impressions  First:  Considering your total clinical experience with this particular patient population, how severe are the " patient's symptoms at this time?: 7 (04/11/18 0540)  Compared to the patient's condition at the START of treatment, this patient's condition is:: 4 (04/11/18 0540)  Most recent:  Considering your total clinical experience with this particular patient population, how severe are the patient's symptoms at this time?: 7 (04/11/18 0540)  Compared to the patient's condition at the START of treatment, this patient's condition is:: 4 (04/11/18 0540)    # Pain Assessment:   Ayla ramon pain level was assessed and she currently denies pain.             Precautions:     Behavioral Orders   Procedures     Code 1 - Restrict to Unit     Routine Programming     As clinically indicated     Status 15     Every 15 minutes.     Withdrawal precautions          Diagnoses:     Axis I:     1.  Alcohol use disorder, severe dependence.   2.  History of major depressive disorder.  Rule out alcohol induced depression.   3.  Nicotine use disorder.   Axis II:  Deferred.   Axis III:  See past medical history.          Plan:     1) Continue MSSA.   2) Will start Wellbutrin XL 150mg Qday.   3) Disposition likely to [facility]. Patient to show improvement in terms of withdrawal and mood prior to discharge.

## 2018-04-13 NOTE — PLAN OF CARE
"Problem: Substance Withdrawal  Goal: Substance Withdrawal  Signs and symptoms of listed problems will be absent or manageable. 1. Detoxification from Alcohol using the [facility] valium protocol  2. Patient will complete assessment paperwork  3.  Patient will meet with  to discuss treatment and discharge planning  4. Physical examination and Lab evaluation by MD  5. Patients oral intake will be greater than 75 % of meals to meet estimated needs  6. Adequate fluid intake     Outcome: No Change  No medications for alcohol withdrawal this shift. Pt out on unit. VSS. Pt stated \"I'm anxious and sad.\" Today is patients wedding anniversary. Pt also wants to be discharged to [facility] today. Pt is still in withdrawal monitoring. Pt informed  could come and visit today.  called unit and will come around 1700. Pt states she is bored. Pt given a AA big book. Alyssa wrote on her recovery discharge planning worksheet when the question was I will know when my recovery plan is working if: pt wrote I can drink without being angry or hateful or sad. Pt took prn vistaril for anxiety. Pt out on unit attending unit programming. Pt did view video Pleasure unwoven which discussed the disease of addiction. Plan is to discharge to [facility] possibly next Monday. Pt has been keeping to herself out on the unit. Pt stated \" None of theses people are my people.\" \"I did not come here to make friends.\"       "

## 2018-04-13 NOTE — PROGRESS NOTES
Behavioral Health  Note    Behavioral Health  Spirituality Group Note    UNIT 3AW    Name: Ayla Del Toro YOB: 1970   MRN: [id number] Age: 48 year old      Patient attended -led group, which included discussion of spirituality, coping with illness and building resilience.    Patient attended group for 1.0 hrs.    The patient actively participated in group discussion and patient demonstrated an appreciation of topic's application for their personal circumstances. Ayla became tearful when talking about the long journey toward recovery in front of her, but expressed hopefulness. A spiritual practice she wanted to work on was learning to play the [place].    Topic/Focus of today's spirituality group: Spirituality from Scratch - Spiritual Practices for Recovery  IMR tie-in: Wellness Strategies  Practice Taught: Body Scan      Tawana Carrasco MDiv, [facility]  Staff   Pager [phone]

## 2018-04-14 PROCEDURE — 12800012 ZZH R&B CD MH INTERMEDIATE ADULT

## 2018-04-14 PROCEDURE — 25000132 ZZH RX MED GY IP 250 OP 250 PS 637: Performed by: PSYCHIATRY & NEUROLOGY

## 2018-04-14 RX ORDER — BUPROPION HYDROCHLORIDE 150 MG/1
150 TABLET ORAL DAILY
Status: DISCONTINUED | OUTPATIENT
Start: 2018-04-14 | End: 2018-04-16 | Stop reason: HOSPADM

## 2018-04-14 RX ADMIN — HYDROXYZINE HYDROCHLORIDE 25 MG: 25 TABLET, FILM COATED ORAL at 16:11

## 2018-04-14 RX ADMIN — HYDROXYZINE HYDROCHLORIDE 25 MG: 25 TABLET, FILM COATED ORAL at 11:17

## 2018-04-14 RX ADMIN — HYDROXYZINE HYDROCHLORIDE 25 MG: 25 TABLET, FILM COATED ORAL at 20:55

## 2018-04-14 RX ADMIN — MULTIPLE VITAMINS W/ MINERALS TAB 1 TABLET: TAB at 07:54

## 2018-04-14 RX ADMIN — FOLIC ACID 1 MG: 1 TABLET ORAL at 07:54

## 2018-04-14 RX ADMIN — NICOTINE POLACRILEX 4 MG: 4 GUM, CHEWING ORAL at 11:39

## 2018-04-14 RX ADMIN — BUPROPION HYDROCHLORIDE 150 MG: 150 TABLET, FILM COATED, EXTENDED RELEASE ORAL at 07:53

## 2018-04-14 RX ADMIN — CETIRIZINE HYDROCHLORIDE 10 MG: 10 TABLET, FILM COATED ORAL at 07:53

## 2018-04-14 ASSESSMENT — ACTIVITIES OF DAILY LIVING (ADL)
DRESS: INDEPENDENT
ORAL_HYGIENE: INDEPENDENT
GROOMING: INDEPENDENT
LAUNDRY: WITH SUPERVISION

## 2018-04-14 NOTE — PROGRESS NOTES
Patient has been without need of withdrawal medication for over   24 hours' time, and has thus graduated to 'out of detox' status.

## 2018-04-14 NOTE — PROGRESS NOTES
The patient has been in the milieu this shift.  The patient states that the meds are helping and that she is starting to feel better.  She has a brighter affect and is denying thoughts of self harm.  Information on Wellbutrin was given to the patient.

## 2018-04-15 PROCEDURE — 12800012 ZZH R&B CD MH INTERMEDIATE ADULT

## 2018-04-15 PROCEDURE — 25000132 ZZH RX MED GY IP 250 OP 250 PS 637: Performed by: PSYCHIATRY & NEUROLOGY

## 2018-04-15 RX ADMIN — HYDROXYZINE HYDROCHLORIDE 25 MG: 25 TABLET, FILM COATED ORAL at 12:31

## 2018-04-15 RX ADMIN — FOLIC ACID 1 MG: 1 TABLET ORAL at 07:41

## 2018-04-15 RX ADMIN — HYDROXYZINE HYDROCHLORIDE 25 MG: 25 TABLET, FILM COATED ORAL at 07:41

## 2018-04-15 RX ADMIN — HYDROXYZINE HYDROCHLORIDE 25 MG: 25 TABLET, FILM COATED ORAL at 16:37

## 2018-04-15 RX ADMIN — CETIRIZINE HYDROCHLORIDE 10 MG: 10 TABLET, FILM COATED ORAL at 07:41

## 2018-04-15 RX ADMIN — HYDROXYZINE HYDROCHLORIDE 25 MG: 25 TABLET, FILM COATED ORAL at 20:19

## 2018-04-15 RX ADMIN — MULTIPLE VITAMINS W/ MINERALS TAB 1 TABLET: TAB at 07:41

## 2018-04-15 RX ADMIN — NICOTINE POLACRILEX 8 MG: 4 GUM, CHEWING ORAL at 12:33

## 2018-04-15 RX ADMIN — BUPROPION HYDROCHLORIDE 150 MG: 150 TABLET, FILM COATED, EXTENDED RELEASE ORAL at 07:41

## 2018-04-15 RX ADMIN — FLUTICASONE PROPIONATE 2 SPRAY: 50 SPRAY, METERED NASAL at 09:56

## 2018-04-15 ASSESSMENT — ACTIVITIES OF DAILY LIVING (ADL)
GROOMING: INDEPENDENT
ORAL_HYGIENE: INDEPENDENT
DRESS: INDEPENDENT

## 2018-04-16 ENCOUNTER — HOSPITAL ENCOUNTER (OUTPATIENT)
Dept: BEHAVIORAL HEALTH | Facility: CLINIC | Age: 48
End: 2018-04-16
Attending: FAMILY MEDICINE
Payer: COMMERCIAL

## 2018-04-16 VITALS
BODY MASS INDEX: 18.84 KG/M2 | WEIGHT: 117.19 LBS | HEIGHT: 66 IN | OXYGEN SATURATION: 98 % | DIASTOLIC BLOOD PRESSURE: 92 MMHG | RESPIRATION RATE: 16 BRPM | HEART RATE: 77 BPM | TEMPERATURE: 96.7 F | SYSTOLIC BLOOD PRESSURE: 134 MMHG

## 2018-04-16 PROCEDURE — 25000132 ZZH RX MED GY IP 250 OP 250 PS 637: Performed by: PSYCHIATRY & NEUROLOGY

## 2018-04-16 PROCEDURE — 10020000 ZZH LODGING PLUS FACILITY CHARGE ADULT

## 2018-04-16 PROCEDURE — 99238 HOSP IP/OBS DSCHRG MGMT 30/<: CPT | Performed by: PSYCHIATRY & NEUROLOGY

## 2018-04-16 RX ORDER — CALCIUM CARBONATE 500 MG/1
1 TABLET, CHEWABLE ORAL 3 TIMES DAILY PRN
Qty: 150 TABLET | COMMUNITY
Start: 2018-04-16

## 2018-04-16 RX ORDER — MAGNESIUM HYDROXIDE/ALUMINUM HYDROXICE/SIMETHICONE 120; 1200; 1200 MG/30ML; MG/30ML; MG/30ML
30 SUSPENSION ORAL EVERY 6 HOURS PRN
COMMUNITY
End: 2018-05-11

## 2018-04-16 RX ORDER — FOLIC ACID 1 MG/1
1 TABLET ORAL DAILY
COMMUNITY

## 2018-04-16 RX ORDER — LORATADINE 10 MG/1
10 TABLET ORAL DAILY PRN
COMMUNITY
End: 2018-05-11

## 2018-04-16 RX ORDER — CETIRIZINE HYDROCHLORIDE 10 MG/1
10 TABLET ORAL DAILY
Qty: 30 TABLET | Refills: 1 | Status: SHIPPED | OUTPATIENT
Start: 2018-04-17

## 2018-04-16 RX ORDER — BUPROPION HYDROCHLORIDE 150 MG/1
150 TABLET ORAL DAILY
Qty: 30 TABLET | Refills: 1 | Status: SHIPPED | OUTPATIENT
Start: 2018-04-17

## 2018-04-16 RX ORDER — AMOXICILLIN 250 MG
2 CAPSULE ORAL DAILY PRN
COMMUNITY
End: 2018-05-11

## 2018-04-16 RX ORDER — HYDROXYZINE HYDROCHLORIDE 25 MG/1
25 TABLET, FILM COATED ORAL EVERY 4 HOURS PRN
Qty: 120 TABLET | Refills: 1 | Status: SHIPPED | OUTPATIENT
Start: 2018-04-16

## 2018-04-16 RX ORDER — MULTIPLE VITAMINS W/ MINERALS TAB 9MG-400MCG
1 TAB ORAL DAILY
COMMUNITY

## 2018-04-16 RX ADMIN — FLUTICASONE PROPIONATE 2 SPRAY: 50 SPRAY, METERED NASAL at 11:31

## 2018-04-16 RX ADMIN — HYDROXYZINE HYDROCHLORIDE 25 MG: 25 TABLET, FILM COATED ORAL at 08:23

## 2018-04-16 RX ADMIN — MULTIPLE VITAMINS W/ MINERALS TAB 1 TABLET: TAB at 08:23

## 2018-04-16 RX ADMIN — FOLIC ACID 1 MG: 1 TABLET ORAL at 08:24

## 2018-04-16 RX ADMIN — NICOTINE POLACRILEX 8 MG: 4 GUM, CHEWING ORAL at 08:43

## 2018-04-16 RX ADMIN — CETIRIZINE HYDROCHLORIDE 10 MG: 10 TABLET, FILM COATED ORAL at 08:24

## 2018-04-16 RX ADMIN — BUPROPION HYDROCHLORIDE 150 MG: 150 TABLET, FILM COATED, EXTENDED RELEASE ORAL at 08:23

## 2018-04-16 NOTE — PROGRESS NOTES
"[facility] Nursing Health Assessment      Vital signs:     There were no vitals taken for this visit.      Transfer from     Counselor: Vika  Drug of Choice: Alcohol  Last use: 4/10/2018  Home clinic/MD: [facility] on [address]  Psychiatrist/therapist: no.  Has had some in the past .  None currently    Medical history/current conditions:  none    H&P Screen:  H&P within the last 90 days: Yes.  Date: 4/16/2018 Location: eA/detox      Mental Health diagnosis: depression  Medication compliant?: yes  Recent sucidal thoughts? no     When? na  Current thought of self-harm? no    Plan? na    Pain assessment:   Pt. Experiencing pain at this time?  Yes.  Rating on 0-10 scale: (1-10 scale): 4.  Location: back, left knee  Chronic  Result of: \"old age\".       Nursing Assessment Summary:  Pt requesting appt with Addiction Medicine for Naltrexone/Vivitrol discussion.  appt made at [facility] on  4/26 at 2:30 pm with Dr. Coburn    On-going nursing intervention required?   No    Acute care visit recommended: no      "

## 2018-04-16 NOTE — PROGRESS NOTES
4/16/18  Pt entered the [facility] unit this date.  She met with counselors and was provided basic materials to include a treatment plan goal sheet and a Patient Safety Plan Template.  Pt verbalizes her willingness for treatment at this time. Pt to attend [facility] orientation and continue program.

## 2018-04-16 NOTE — PROGRESS NOTES
Diet & BMI Assessment     Are you on a special diet?    No   Do you have any concerns regarding your nutritional status?   No   Have you had any appetite changes in the last 3 months?       Yes, If yes explain: I'm eating more now because I have stopped drinking alcohol   Have you had any weight loss or weight gain in the last 3 months?    If yes, how much weight gain or loss:    If weight patient gains or loses is more than 10 pounds, refer to primary care provider for assessment.        Yes, If yes explain: weight loss due to my drinking alcohol   Was the patient informed of BMI?    Below,  General nutrition education   Yes

## 2018-04-16 NOTE — PROGRESS NOTES
Name: Ayla Del Toro  Date: 4/16/2018  Medical Record: [id number]    Envelope Number: [id number]    List of Contents (List each item separately in new row):   1 cellphone   Admission:  I am responsible for any personal items that are not sent to the safe or pharmacy.  [facility] is not responsible for loss, theft or damage of any property in my possession.      Patient Signature:  ___________________________________________       Date/Time:__________________________    Staff Signature: __________________________________       Date/Time:__________________________    2nd Staff person, if patient is unable/unwilling to sign:      __________________________________________________________       Date/Time: __________________________      Discharge:  [facility] has returned all of my personal belongings:    Patient Signature: ________________________________________     Date/Time: ____________________________________    Staff Signature: ______________________________________     Date/Time:_____________________________________

## 2018-04-16 NOTE — PROGRESS NOTES
Initial Services Plan        Before your first treatment group, please do the following    Immediate health & safety concerns: Go to the emergency room if you start to have withdrawal symptoms.  Look for a support network (such as AA, NA, DBT group, a Christianity group, etc.)    Suggestions for client during the time between intake & completion of treatment plan:  Tour your treatment center (unit or outpatient clinic).  Introduce yourself to the treatment group.  Spend time getting to know your peers.    Client issues to be addressed in the first treatment sessions:  Other just how to cope.  My first time in treatment      Paula Betancourt RN  4/16/2018  12:30 PM

## 2018-04-16 NOTE — DISCHARGE SUMMARY
Psychiatric Discharge Summary    Ayla Del Toro MRN# [id number]   Age: 48 year old YOB: 1970     Date of Admission:  4/10/2018  Date of Discharge:  4/16/2018 11:58 AM  Admitting Physician:  Shiva Bridges MD  Discharge Physician:  Shiva Bridges MD          Event Leading to Hospitalization:   HISTORY OF PRESENT ILLNESS:  The patient is a 48-year-old white female with a history of alcohol use disorder and depression who was admitted after drinking up to 1.75 L of wine daily.  Blood alcohol level was 0.43 on admission.  Says she is feeling a little shaky but denies any history of seizures or DTs.  Mood is scared.  Says she sleeps okay, although had a hard time getting back to sleep last night and did get some trazodone.  Has not been eating well due to the alcohol but is getting some appetite back.  Denies any suicidal or homicidal ideation.  Denies any auditory or visual hallucinations.        See Admission note by Shiva Bridges MD for additional details.          DIagnoses:     Axis I:     1.  Alcohol use disorder, severe dependence.   2.  History of major depressive disorder.  Rule out alcohol induced depression.   3.  Nicotine use disorder.   Axis II:  Deferred.   Axis III:  See past medical history.          Labs:          Lab Results   Component Value Date     04/11/2018    Lab Results   Component Value Date    CHLORIDE 103 04/11/2018    Lab Results   Component Value Date    BUN 9 04/11/2018      Lab Results   Component Value Date    POTASSIUM 4.7 04/11/2018    Lab Results   Component Value Date    CO2 26 04/11/2018    Lab Results   Component Value Date    CR 0.57 04/11/2018        Lab Results   Component Value Date    WBC 5.0 04/11/2018    HGB 13.3 04/11/2018    HCT 39.7 04/11/2018     (H) 04/11/2018     04/11/2018     Lab Results   Component Value Date    AST 75 (H) 04/11/2018    ALT 55 (H) 04/11/2018     (H) 04/11/2018    ALKPHOS 66 04/11/2018     BILITOTAL 0.9 04/11/2018     Lab Results   Component Value Date    TSH 2.67 05/22/2007            Consults:   Consultation during this admission received from internal medicine:    Ayla Del Toro is a 48 year old female with a history of alcohol abuse and allergic rhinitis who was admitted to 3A for alcohol detoxification.     Alcohol Abuse and Withdrawal - Management per Psychiatry.     Intermittent Chest Pain and Minimal JEAN - Symptoms not currently present. Unclear etiology with broad ddx. No prior evaluation, EKGs, or Echocardiograms. Remains hemodynamically stable.   - Continue to monitor. Notify Medicine and obtain troponin/EKG if symptoms occur this admission.  - Outpatient follow up with PCP for further evaluation including possible stress test     Chronic Diarrhea - Likely secondary to alcohol abuse. Check C.diff PCR given recent antibiotic use. If negative, can start PRN Imodium. If persists after withdrawal and alcohol cessation, follow up with PCP vs luminal GI for further evaluation.     Mild Transaminitis - Likely secondary to ETOH abuse. Improving with cessation. ALT 55 (60), AST 75 (88) on 4/11. TBili and Alk Phos wnl. Asymptomatic.  - Repeat CMP per PCP within 1 month of discharge     Allergic Rhinitis - Stable. Continue PTA Zyrtec and Flonase.     GERD - Intermittent symptoms PTA, none at present. Declined initiation of PPI or H2 blocker. Start Tums TID PRN.         Hospital Course:   Ayla Del Toro was admitted to [facility] with attending Shiva Bridges MD as a voluntary patient. The patient was placed under status 15 (15 minute checks) to ensure patient safety.   MSSA protocol was initiated due to the patient's history of alcohol abuse and concern for withdrawal symptoms.  CBC, BMP and utox obtained.    The patient had not been taking any medications as an outpatient. Wellbutrin was added targeting depression.     Ayla Del Toro did participate in groups and was visible in the milieu.     The  patient's symptoms of withdrawal and depression improved.     # Discharge Pain Plan:   - Patient currently has NO PAIN and is not being prescribed pain medications on discharge.      Ayla Del Toro was released to  treatment. At the time of discharge Ayla Del Toro was determined to not be a danger to herself or others. At the current time of discharge, the patient does not meet criteria for involuntary hospitalization. On the day of discharge, the patient reports that they do not have suicidal or homicidal ideation and would never hurt themselves or others. Steps taken to minimize risk include: assessing patient s behavior and thought process daily during hospital stay, discharging patient with adequate plan for follow up for mental and physical health and discussing safety plan of returning to the hospital should the patient ever have thoughts of harming themselves or others. Therefore, based on all available evidence including the factors cited above, the patient does not appear to be at imminent risk for self-harm, and is appropriate for outpatient level of care.            Discharge Medications:     Current Discharge Medication List      START taking these medications    Details   calcium carbonate (TUMS) 500 MG chewable tablet Take 1 tablet (500 mg) by mouth 3 times daily as needed for heartburn  Qty: 150 tablet      buPROPion (WELLBUTRIN XL) 150 MG 24 hr tablet Take 1 tablet (150 mg) by mouth daily  Qty: 30 tablet, Refills: 1    Associated Diagnoses: Moderate episode of recurrent major depressive disorder (H)      hydrOXYzine (ATARAX) 25 MG tablet Take 1 tablet (25 mg) by mouth every 4 hours as needed for anxiety  Qty: 120 tablet, Refills: 1    Associated Diagnoses: Moderate episode of recurrent major depressive disorder (H)         CONTINUE these medications which have CHANGED    Details   cetirizine (ZYRTEC) 10 MG tablet Take 1 tablet (10 mg) by mouth daily  Qty: 30 tablet, Refills: 1    Associated  Diagnoses: Chronic allergic rhinitis due to pollen, unspecified seasonality         CONTINUE these medications which have NOT CHANGED    Details   fluticasone (FLONASE) 50 MCG/ACT spray Spray 1-2 sprays into both nostrils daily      Ibuprofen-Diphenhydramine Cit (ADVIL PM) 200-38 MG TABS Take 1 tablet by mouth nightly as needed (sleep)                  Psychiatric Examination:   Appearance:  awake, alert and adequately groomed  Attitude:  cooperative  Eye Contact:  good  Mood:  anxious and better  Affect:  mood congruent  Speech:  clear, coherent  Psychomotor Behavior:  no evidence of tardive dyskinesia, dystonia, or tics  Thought Process:  goal oriented  Associations:  no loose associations  Thought Content:  no evidence of suicidal ideation or homicidal ideation and no evidence of psychotic thought  Insight:  fair  Judgment:  intact  Oriented to:  time, person, and place  Attention Span and Concentration:  intact  Recent and Remote Memory:  fair  Language: Able to read and write  Fund of Knowledge: appropriate  Muscle Strength and Tone: normal  Gait and Station: Normal         Discharge Plan:   Continue Wellbutrin 150mg Qam. Could titrate if needed.     Major Treatments, Procedures and Findings:  You were detoxed from alcohol with the Modified Selective Severity Protocol using Valium. You have met with a  to develop a treatment plan for discharge.  You have had labs drawn and a copy of those labs will be sent home with you.  Please bring your lab results with to your follow up doctor appointment.     Symptoms to Report:  If you experience more anxiety, confusion, sleeplessness, deep sadness or thoughts of suicide, notify your treatment team or notify your primary care physician. IF ANY OF THE SYMPTOMS YOU ARE EXPERIENCING ARE A MEDICAL EMERGENCY CALL 911 IMMEDIATELY.      Lifestyle Adjustment: Adjust your lifestyle to get enough sleep, relaxation, exercise and  good nutrition. Continue to develop  healthy coping skills to decrease stress and promote a sober living environment. Do not use alcohol, illegal drugs or addictive medications other than what is currently prescribed. AA, NA, and  Sponsor are excellent resources for support.         Keeping hands clean is one of the most important steps we can take to avoid getting sick and spreading germs to others.  Please wash your hands frequently.      Primary Provider:  [facility] [place] [facility]  [facility]   Thursday, May 17, 2018 @ 10:20 am  [phone]        DISCHARGE RESOURCES:  -SMART Recovery - self management for addiction recovery:  www.smartrecovery.org    -[facility] ~ A Health Crisis Resource & Support Center: 336.756.6245.  -[facility] 038-337-5558   -[facility] Behavioral Intake 228-558-8660 or 639-645-8700.  -Crisis Intervention: 921.358.4926 or 983-699-4848 (TTY: 920.176.4937).  Call anytime.  -Suicide Awareness Voices of Education (SAVE) (www.save.org): 775-821-SHEV (4744)  -National Suicide Prevention Line (www.mentalhealthmn.org): 462-660-HTFB (5799)  -National [place] on Mental Illness (www.mn.rody.org): 722.123.4581 or 286-081-6745.  -Pczq4mcop: text the word LIFE to 52538 for immediate support and crisis intervention  -Mental Health Consumer/Survivor Network of [place] (www.mhcsn.net): 899.223.2098 or 025-838-3555  -Mental Health Association of [place] (www.mentalhealth.org): 938.728.6079 or 324-415-7098     -Substance Abuse and Mental Health Services (www.samhsa.gov)  -Harm Reduction Coalition (www. Harmreduction.org)  -www.prescribetoprevent.org or http://prescribetoprevent.org/video  -Poison control 6-292-202-8825     Attestation:  The patient was seen and evaluated by me. I spent less than 30 minutes on discharge day activities. Shiva Bridges MD

## 2018-04-16 NOTE — PROGRESS NOTES
Patient discharged with personal belongings and discharge medications to [facility]. Discharge medication instructions and lab results reviewed. Patient verbalizes understanding. Denies thoughts of self harm.Patient escorted off the unit by Psyche Associate Savanna SORIANO
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
## 2018-04-17 ENCOUNTER — HOSPITAL ENCOUNTER (OUTPATIENT)
Dept: BEHAVIORAL HEALTH | Facility: CLINIC | Age: 48
End: 2018-04-17
Attending: FAMILY MEDICINE
Payer: COMMERCIAL

## 2018-04-17 DIAGNOSIS — F17.200 NICOTINE DEPENDENCE: Primary | ICD-10-CM

## 2018-04-17 PROBLEM — F19.20 CHEMICAL DEPENDENCY (H): Status: ACTIVE | Noted: 2018-04-17

## 2018-04-17 PROCEDURE — H2035 A/D TX PROGRAM, PER HOUR: HCPCS | Mod: HQ

## 2018-04-17 PROCEDURE — 10020000 ZZH LODGING PLUS FACILITY CHARGE ADULT

## 2018-04-17 RX ORDER — POLYETHYLENE GLYCOL 3350 17 G
2 POWDER IN PACKET (EA) ORAL
Qty: 144 LOZENGE | Refills: 1 | Status: SHIPPED | OUTPATIENT
Start: 2018-04-17

## 2018-04-17 NOTE — PROGRESS NOTES
Patient Safety Plan Template    Name:   Ayla Del Toro YOB: 1970 Age:  48 year old MR Number:  [id number]   Step 1: Warning signs (Thoughts, images, mood, situation, behavior) that a crisis may be developin. Sadness  (But I cry a lot over everything in general)     2. Stress     3. Being around alcohol     Step 2: Internal coping strategies - Things I can do to take my mind off of my problems without contacting another person (relaxation technique, physical activity):     1. Exercise     2.  Reading     3.  Quiet alone time     Step 3: People and social settings that provide distraction:     1. Name: Luc Gibson    Phone: [phone]   2. Name: noncompletion   Phone: noncompletion   3. Place: noncompletion   4. Place: noncompletion     The one thing that is most important to me and worth living for is: trying to make the world a better place   Step 4: People whom I can ask for help:     1. Name: Ash Gibson - spouse   Phone: [phone]     2. Name: De Borgia - friend   Phone: cell     3. Name: Mony - friend    Phone: cell     Step 5: Professionals or agencies I can contact during a crisis:     1. Clinician Name: Dr Davin Groves   [facility] [place] Phone:    Clinician Pager or Emergency Contact #: none completion     2. Clinician Name: non completion   Phone: non completion     Clinician Pager or Emergency Contact #: non completion     3. Local Urgent Care Services: noncompletion    Urgent Care Services Address: noncompletion    Urgent Care Services Phone: noncompletion     4. Suicide Prevention Lifeline Phone: 4-681-716-QABK (0755)     Step 6: Making the environment safe:     1. Sleep     2. Exercise     Safety Plan Template 2008 Anali Ibarra and Graham Lazar is reprinted with the express permission of the authors.  No portion of the Safety Plan Template may be reproduced without the express, written permission.  You can contact the authors at [email or web address] or  [email or web address].

## 2018-04-17 NOTE — PROGRESS NOTES
Integrative Therapies: Essential Oils    Patient requesting Calm/Veti-Jennifer essential oil inhaler to manage (Sleep/anxiety/mood). Discussed appropriate use of essential oil inhalers and instructed patient not to leave labeled product out on unit.     Patient verbalized and demonstrated understanding of how to use essential oil inhaler correctly and will notify [facility] RN with any concerns or side effects. Patient agrees not to share their essential oil inhaler with other clients.    Continue to support the patient in safely utilizing integrative therapies as able to manage symptoms during treatment.
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
## 2018-04-18 ENCOUNTER — HOSPITAL ENCOUNTER (OUTPATIENT)
Dept: BEHAVIORAL HEALTH | Facility: CLINIC | Age: 48
End: 2018-04-18
Attending: FAMILY MEDICINE
Payer: COMMERCIAL

## 2018-04-18 PROCEDURE — H2035 A/D TX PROGRAM, PER HOUR: HCPCS | Mod: HQ

## 2018-04-18 PROCEDURE — 10020000 ZZH LODGING PLUS FACILITY CHARGE ADULT

## 2018-04-18 NOTE — PROGRESS NOTES
Name: Ayla Del Toro  Date: 4/17/2018  Medical Record: [id number]    Envelope Number: [id number]    List of Contents (List each item separately in new row):   D3  2000IU (Opened Bottle)    Admission:  I am responsible for any personal items that are not sent to the safe or pharmacy.  [facility] is not responsible for loss, theft or damage of any property in my possession.      Patient Signature:  ___________________________________________       Date/Time:__________________________    Staff Signature: __________________________________       Date/Time:__________________________    2nd Staff person, if patient is unable/unwilling to sign:      __________________________________________________________       Date/Time: __________________________      Discharge:  [facility] has returned all of my personal belongings:    Patient Signature: ________________________________________     Date/Time: ____________________________________    Staff Signature: ______________________________________     Date/Time:_____________________________________

## 2018-04-18 NOTE — PROGRESS NOTES
"4/18/18  Pt shared assignment \"Book of Me - Affirmations\" re-framing negative self-talk statements to positive and identifying some of her accomplishments, strengths and qualities.  Positives Pt identified included being accepting, flexible, optomisticand intuitive.  Pt reports she finds happiness in art, nature, literature and music. Pt has a tendency to view herself as unique.  Pt was provided feedback and support from peers and counselors.  Pt was tearful when sharing her positives.  Pt to continue to affirm herself daily.  "

## 2018-04-19 ENCOUNTER — HOSPITAL ENCOUNTER (OUTPATIENT)
Dept: BEHAVIORAL HEALTH | Facility: CLINIC | Age: 48
End: 2018-04-19
Attending: FAMILY MEDICINE
Payer: COMMERCIAL

## 2018-04-19 PROCEDURE — H2035 A/D TX PROGRAM, PER HOUR: HCPCS | Mod: HQ

## 2018-04-19 PROCEDURE — 10020000 ZZH LODGING PLUS FACILITY CHARGE ADULT

## 2018-04-20 ENCOUNTER — HOSPITAL ENCOUNTER (OUTPATIENT)
Dept: BEHAVIORAL HEALTH | Facility: CLINIC | Age: 48
End: 2018-04-20
Attending: FAMILY MEDICINE
Payer: COMMERCIAL

## 2018-04-20 PROCEDURE — H2035 A/D TX PROGRAM, PER HOUR: HCPCS | Mod: HQ

## 2018-04-20 PROCEDURE — 10020000 ZZH LODGING PLUS FACILITY CHARGE ADULT

## 2018-04-21 ENCOUNTER — HOSPITAL ENCOUNTER (OUTPATIENT)
Dept: BEHAVIORAL HEALTH | Facility: CLINIC | Age: 48
End: 2018-04-21
Attending: FAMILY MEDICINE
Payer: COMMERCIAL

## 2018-04-21 PROCEDURE — H2035 A/D TX PROGRAM, PER HOUR: HCPCS | Mod: HQ

## 2018-04-21 PROCEDURE — 10020000 ZZH LODGING PLUS FACILITY CHARGE ADULT

## 2018-04-21 NOTE — PROGRESS NOTES
Name: Ayla Del Toro  Date: 4/20/2018  Medical Record: [id number]    Envelope Number: [id number]    List of Contents (List each item separately in new row):   One Cell Phone (repackaged from Env: [id number])    Admission:  I am responsible for any personal items that are not sent to the safe or pharmacy.  [facility] is not responsible for loss, theft or damage of any property in my possession.      Patient Signature:  ___________________________________________       Date/Time:__________________________    Staff Signature: __________________________________       Date/Time:__________________________    2nd Staff person, if patient is unable/unwilling to sign:      __________________________________________________________       Date/Time: __________________________      Discharge:  [facility] has returned all of my personal belongings:    Patient Signature: ________________________________________     Date/Time: ____________________________________    Staff Signature: ______________________________________     Date/Time:_____________________________________

## 2018-04-22 ENCOUNTER — HOSPITAL ENCOUNTER (OUTPATIENT)
Dept: BEHAVIORAL HEALTH | Facility: CLINIC | Age: 48
End: 2018-04-22
Attending: FAMILY MEDICINE
Payer: COMMERCIAL

## 2018-04-22 PROCEDURE — 10020000 ZZH LODGING PLUS FACILITY CHARGE ADULT

## 2018-04-22 PROCEDURE — H2035 A/D TX PROGRAM, PER HOUR: HCPCS | Mod: HQ

## 2018-04-23 ENCOUNTER — HOSPITAL ENCOUNTER (OUTPATIENT)
Dept: BEHAVIORAL HEALTH | Facility: CLINIC | Age: 48
End: 2018-04-23
Attending: FAMILY MEDICINE
Payer: COMMERCIAL

## 2018-04-23 PROCEDURE — H2035 A/D TX PROGRAM, PER HOUR: HCPCS

## 2018-04-23 PROCEDURE — H2035 A/D TX PROGRAM, PER HOUR: HCPCS | Mod: HQ

## 2018-04-23 PROCEDURE — 10020000 ZZH LODGING PLUS FACILITY CHARGE ADULT

## 2018-04-23 RX ORDER — CHOLECALCIFEROL (VITAMIN D3) 25 MCG
1000 CAPSULE ORAL WEEKLY
COMMUNITY

## 2018-04-23 NOTE — PROGRESS NOTES
Call received from [facility] [place] stating patient okay to take Vitamin D3 1,000 IU once weekly per her PCP NELIDA Esqueda, CNP. Patient and MAR updated accordingly. Tumeric and milk thistle OTC supplements not approved for use in LP per PCP and will be packaged and sent to security for storage during treatment stay.

## 2018-04-23 NOTE — PROGRESS NOTES
Name: Ayla Del Toro  Date: 4/23/2018  Medical Record: [id number]    Envelope Number: [id number]    List of Contents (List each item separately in new row):   Milk Thistle 250mg Caps, Turmeric 500mg Caps.     Admission:  I am responsible for any personal items that are not sent to the safe or pharmacy.  [facility] is not responsible for loss, theft or damage of any property in my possession.      Patient Signature:  ___________________________________________       Date/Time:__________________________    Staff Signature: __________________________________       Date/Time:__________________________    2nd Staff person, if patient is unable/unwilling to sign:      __________________________________________________________       Date/Time: __________________________      Discharge:  [facility] has returned all of my personal belongings:    Patient Signature: ________________________________________     Date/Time: ____________________________________    Staff Signature: ______________________________________     Date/Time:_____________________________________
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
## 2018-04-24 ENCOUNTER — HOSPITAL ENCOUNTER (OUTPATIENT)
Dept: BEHAVIORAL HEALTH | Facility: CLINIC | Age: 48
End: 2018-04-24
Attending: FAMILY MEDICINE
Payer: COMMERCIAL

## 2018-04-24 PROCEDURE — H2035 A/D TX PROGRAM, PER HOUR: HCPCS | Mod: HQ

## 2018-04-24 PROCEDURE — 10020000 ZZH LODGING PLUS FACILITY CHARGE ADULT

## 2018-04-24 NOTE — PROGRESS NOTES
4/24/18  Pt share an assignment focusing on consequences of her use identifying values that were violated when using to include parenting, self-respect, serenity, exercise, freedom and doing a good job.  Pt identified emotional consequences to include shame, failure, embarrassment, disappointment and feeling stupid.  Pt shared examples of harmful behaviors. Pt was given feedback and support from peers and counselors.  Pt to continue further introspection.

## 2018-04-25 ENCOUNTER — HOSPITAL ENCOUNTER (OUTPATIENT)
Dept: BEHAVIORAL HEALTH | Facility: CLINIC | Age: 48
End: 2018-04-25
Attending: FAMILY MEDICINE
Payer: COMMERCIAL

## 2018-04-25 PROCEDURE — H2035 A/D TX PROGRAM, PER HOUR: HCPCS | Mod: HQ

## 2018-04-25 PROCEDURE — 10020000 ZZH LODGING PLUS FACILITY CHARGE ADULT

## 2018-04-25 NOTE — PROGRESS NOTES
Patient:  Ayla Del Toro            Adult CD Progress Note and Treatment Plan Review     Attendance  Please refer to OP BEH CD Adult Attendance Record Documentation Flowsheet    Support group attended this week: yes    Reporting sobriety:  Yes    Treatment Plan     Treatment Plan Review competed on: 4/25/2018    Client preferred learning style:  Visual, verbal, demonstration    Staff Members contributing  Davin Mckeon [facility] and  Alexandra Cano [facility], Sissy Broussard [facility]     Received Supervision: Yes    Client: contributed to goals and plan.    Client received copy of plan/revised plan: Yes    Client agrees with plan/revised plan: Yes    Changes to Treatment Plan: No    New Goals added since last review No    Goals worked on since last review  Continuing stabilization, relationships, consequences of use, 1.1 therapy, grief/loss, managing anxiety, spirituality     Strategies effective: yes    Strategies need these changes: Continue to address goals.    1) Care Coordination Activities:  Discussing options for outpatient program following [facility]  2) Medical, Mental Health and other appointments the client attended: 1.1 therapy with Rochelle MANCIA  3) Medication issues:  Following medication regiment as prescribed   4) Physical and mental health problems:  See Dim 2 and 3  5) Any changes in Vulnerable Adult Status?  no  6) Review and evaluation of the individual abuse prevention plan: no individual prevention plan needed      Guide to Risk Ratings for Suicidality:   IDEATION: Active thoughts of suicide? INTENT: Intent to follow on suicide? PLAN: Plan to follow through on suicide? Level of Risk:   IF Yes Yes Yes Patient = High Emergent   IF Yes Yes No Patient = High Urgent/Non-Emergent   IF Yes No No Patient = Moderate Non-Urgent   IF No No   No Patient = Low Risk   The patient's ADDITIONAL RISK FACTORS and lack of PROTECTIVE FACTORS may increase their overall suicide risk ratings.     Patient's/client's current  "risk rating:  Low Risk      ASAM Risk Rating:    Dimension 1 Risk  0  Pt reports last use date as 04/10/18.   Pt denies experiencing any symptoms of withdrawal this week.    Dimension 2 Risk   0   Pt denies any medical issues that would impair her ability to participate in programming.  She is able to access medical care as needed.    Dimension 3 Risk  2  Pt reports she is feeling less stress, stronger and less depressed.   She shared an assignment to aid her in building healthy esteem. She completed an assignment \"Developing a Sense of Self\" sharing how she experiences maxime, peace, fun, spirituality and blessings.   Pt identifies herself as \"different\" which seems to be a means of gaining attention.  Pt met with therapist, Rochelle Ross, and plans to seek out Rochelle is she would like further sessions.  Pt plans to return to 1.1 therapy with therapist, Deb Goldberg, following [facility] completion.  Pt Pt participated in grief group, facilitated by NILES Neal, [facility].  Pt denies any thoughts of suicide or self harm.    nurse provided Pt with essential oils to aid in managing her anxiousness.,      Dimension 4 Risk  2  Pt shared an assignment identifying consequences of her use.  She reports education and reflection has been motivating for her.  Pt attends all programming.  She is an active participant and appears supportive of her peers.       Dimension 5 Risk 4   Pt rated her cravings at a 2, on a scale of 1 to 10, ten being high.  Coping skills identified by Pt include speaking with peers, breathing exercises, reading and walking.  Pt participated in the spirituality group, facilitated by Rola Godfrey and Yanet Solis [facility], was present during group.  Pt plan to attend relapse prevention workshops over the upcoming weekend.  She verbalizes her willingness to enter an outpatient program following [facility]    Dimension 6  Risk 3 Pt is spending free time with female peers and attending 2+ " 12-step meetings weekly while in [place].  Pt attended relationship workshops.  Pt invited her spouse to attend the family week program.  Pt plans to return to her home following program completion.      Any changes in Vulnerable Adult Status?  No  If yes, add to treatment plan and individual abuse prevention plan.    Family Involvement:   None this week    Data:   client did participate  Pt attended Dr Coburn's presentation on addiction and withdrawal from mood altering substances.     Intervention:   Aftercare planning  Counselor feedback  Education  Emotional management  Group feedback  Relapse prevention  Client & counselor reviewed and signed ISP & assessment summary  Mental health education    Assessment:   Stages of Change Model  Contemplation  Preparation/Determination    Appears/Sounds:  Cooperative  Engaged    Plan:  Focus on recovery environment  Monitor emotional/physical health    GARRETT Naranjo

## 2018-04-26 ENCOUNTER — HOSPITAL ENCOUNTER (OUTPATIENT)
Dept: BEHAVIORAL HEALTH | Facility: CLINIC | Age: 48
End: 2018-04-26
Attending: FAMILY MEDICINE
Payer: COMMERCIAL

## 2018-04-26 ENCOUNTER — TELEPHONE (OUTPATIENT)
Dept: BEHAVIORAL HEALTH | Facility: CLINIC | Age: 48
End: 2018-04-26

## 2018-04-26 ENCOUNTER — OFFICE VISIT (OUTPATIENT)
Dept: ADDICTION MEDICINE | Facility: CLINIC | Age: 48
End: 2018-04-26
Payer: COMMERCIAL

## 2018-04-26 VITALS
OXYGEN SATURATION: 97 % | BODY MASS INDEX: 18.88 KG/M2 | RESPIRATION RATE: 16 BRPM | HEART RATE: 92 BPM | WEIGHT: 117 LBS

## 2018-04-26 DIAGNOSIS — F10.20 UNCOMPLICATED ALCOHOL DEPENDENCE (H): Primary | ICD-10-CM

## 2018-04-26 PROCEDURE — 10020000 ZZH LODGING PLUS FACILITY CHARGE ADULT

## 2018-04-26 PROCEDURE — 99214 OFFICE O/P EST MOD 30 MIN: CPT | Performed by: FAMILY MEDICINE

## 2018-04-26 PROCEDURE — H2035 A/D TX PROGRAM, PER HOUR: HCPCS | Mod: HQ

## 2018-04-26 RX ORDER — NALTREXONE HYDROCHLORIDE 50 MG/1
50 TABLET, FILM COATED ORAL DAILY
Qty: 30 TABLET | Refills: 1 | Status: SHIPPED | OUTPATIENT
Start: 2018-04-26

## 2018-04-26 NOTE — PROGRESS NOTES
SUBJECTIVE:   Ayla Del Toro is a 48 year old female who presents to clinic today for the following health issues:          ADDICTION MEDICINE NOTE:    INITIAL VISIT    [facility] PATIENT     ALCOHOL DEPENDENCE     1ST TREATMENT     HERE TO DISCUSS MAT FOR ALCOHOL DEPENDENCE     WAS DRINKING 1/75 LITER WINE PER DAY    LONG TIME USER    AVOIDED TREATMENT DUE TO DENIAL    CAME IN BECAUSE SHE KNEW IT WAS TIME    INTERESTED IN NALTREXONE BUT NOT ANTABUSE    NO CRAVING WHILE IN TREATMENT BUT SHE ANTICIPATES IT WHEN SHE IS DISCHARGED    NOT WORRIED ABOUT ADHERENCE    DISCUSSED PROS AND CONS OF NALTREXONE, RISKS AND BENEFITS    PMH: REVIEWED     SOCIAL HISTORY:   LIVES IN [facility] WITH   2 CHILDREN AGED 18.15  WAS WORKING AS [employer] TE[employer]        Problem list and histories reviewed & adjusted, as indicated.  Additional history: as documented    Patient Active Problem List   Diagnosis     Depressive disorder, not elsewhere classified     Anxiety state     Allergic rhinitis due to other allergen     Alcohol abuse     Chemical dependency (H)     Past Surgical History:   Procedure Laterality Date     C TOTAL ABDOM HYSTERECTOMY       HC REMOVE TONSILS/ADENOIDS,<11 Y/O       HC TOOTH EXTRACTION W/FORCEP         Social History   Substance Use Topics     Smoking status: Current Some Day Smoker     Packs/day: 0.25     Types: Cigarettes     Last attempt to quit: 5/18/2006     Smokeless tobacco: Never Used      Comment: recreationally     Alcohol use Yes     Family History   Problem Relation Age of Onset     Depression Mother      CANCER Mother      Ductal carcinoma in situ (DCIS)     CANCER Father      Pseudomyxoma peritonei      Depression Maternal Aunt          Current Outpatient Prescriptions   Medication Sig Dispense Refill     naltrexone (DEPADE;REVIA) 50 MG tablet Take 1 tablet (50 mg) by mouth daily 30 tablet 1     Acetaminophen (TYLENOL PO) Take 650 mg by mouth every 4 hours as needed for mild pain or  fever       alum & mag hydroxide-simethicone (MYLANTA/MAALOX) 200-200-20 MG/5ML SUSP suspension Take 30 mLs by mouth every 6 hours as needed for indigestion       buPROPion (WELLBUTRIN XL) 150 MG 24 hr tablet Take 1 tablet (150 mg) by mouth daily 30 tablet 1     calcium carbonate (TUMS) 500 MG chewable tablet Take 1 tablet (500 mg) by mouth 3 times daily as needed for heartburn 150 tablet      cetirizine (ZYRTEC) 10 MG tablet Take 1 tablet (10 mg) by mouth daily 30 tablet 1     Cholecalciferol (D3 HIGH POTENCY) 1000 units CAPS Take 1,000 Int'l Units by mouth once a week Approved by PCP on 4/23/2018       fluticasone (FLONASE) 50 MCG/ACT spray Spray 1-2 sprays into both nostrils daily       folic acid (FOLVITE) 1 MG tablet Take 1 mg by mouth daily       guaiFENesin (ROBITUSSIN) 20 mg/mL SOLN solution Take 10 mLs by mouth every 4 hours as needed for cough       hydrOXYzine (ATARAX) 25 MG tablet Take 1 tablet (25 mg) by mouth every 4 hours as needed for anxiety 120 tablet 1     IBUPROFEN PO Take 400 mg by mouth every 6 hours as needed for moderate pain       Ibuprofen-Diphenhydramine Cit (ADVIL PM) 200-38 MG TABS Take 1 tablet by mouth nightly as needed (sleep)       loratadine (CLARITIN) 10 MG tablet Take 10 mg by mouth daily as needed for allergies       MELATONIN PO Take 3 mg by mouth nightly as needed       multivitamin, therapeutic with minerals (CERTAVITE/ANTIOXIDANTS) TABS tablet Take 1 tablet by mouth daily       nicotine polacrilex (COMMIT) 2 MG lozenge Place 1 lozenge (2 mg) inside cheek every hour as needed for smoking cessation 144 lozenge 1     phenol-menthol (CEPASTAT) 14.5 MG lozenge Place 1 lozenge inside cheek every 2 hours as needed for moderate pain       senna-docusate (SENOKOT-S;PERICOLACE) 8.6-50 MG per tablet Take 2 tablets by mouth daily as needed for constipation       Allergies   Allergen Reactions     Hydrocodone-Acetaminophen Nausea and Vomiting     Oxycodone Nausea and Vomiting     Labs  reviewed in EPIC    Reviewed and updated as needed this visit by clinical staff  Tobacco  Allergies  Meds       Reviewed and updated as needed this visit by Provider         ROS:      OBJECTIVE:     Pulse 92  Resp 16  Wt 117 lb (53.1 kg)  SpO2 97%  BMI 18.88 kg/m2  Body mass index is 18.88 kg/(m^2).     ROS:  Constitutional, HEENT, cardiovascular, pulmonary, gi and gu systems are negative, except as otherwise noted.    Pulse 92  Resp 16  Wt 117 lb (53.1 kg)  SpO2 97%  BMI 18.88 kg/m2  EXAM:  GENERAL APPEARANCE: healthy, alert and no distress  EYES: Eyes grossly normal to inspection, PERRL and conjunctivae and sclerae normal  NEURO: Normal strength and tone, mentation intact and speech normal  PSYCH: mentation appears normal and affect normal/bright  MENTAL STATUS EXAM:  Appearance/Behavior: No apparent distress and Casually groomed  Speech: Normal  Mood/Affect: normal affect  Insight: Adequate      Diagnostic Test Results:  none     ASSESSMENT:   ALCOHOL DEPENDENCE      PLAN:       ICD-10-CM    1. Uncomplicated alcohol dependence (H) F10.20 naltrexone (DEPADE;REVIA) 50 MG tablet           MEDICATIONS:   Orders Placed This Encounter   Medications     naltrexone (DEPADE;REVIA) 50 MG tablet     Sig: Take 1 tablet (50 mg) by mouth daily     Dispense:  30 tablet     Refill:  1          - Continue other medications without change  FUTURE APPOINTMENTS:       - Follow-up visit in 1 MONTH    Graham Coburn MD  [facility] ADDICTION MEDICINE

## 2018-04-26 NOTE — TELEPHONE ENCOUNTER
A phone call was made as follow-up to the Family Program mailing for the week of 4/30 or 5/7/18. Message left or spoke in person to individuals on [facility].

## 2018-04-26 NOTE — PROGRESS NOTES
Name: Ayla Del Toro  Date: 4/26/2018  Medical Record: [id number]    Envelope Number: [id number]    List of Contents (List each item separately in new row):   (1) cellphone with few small cracks on screen(Incipio case purple in color)  (1)  with cord (white)    Admission:  I am responsible for any personal items that are not sent to the safe or pharmacy.  [facility] is not responsible for loss, theft or damage of any property in my possession.      Patient Signature:  ___________________________________________       Date/Time:__________________________    Staff Signature: __________________________________       Date/Time:__________________________    2nd Staff person, if patient is unable/unwilling to sign:      __________________________________________________________       Date/Time: __________________________      Discharge:  [facility] has returned all of my personal belongings:    Patient Signature: ________________________________________     Date/Time: ____________________________________    Staff Signature: ______________________________________     Date/Time:_____________________________________

## 2018-04-26 NOTE — MR AVS SNAPSHOT
After Visit Summary   4/26/2018    Ayla Del Toro    MRN: [id number]           Patient Information     Date Of Birth          1970        Visit Information        Provider Department      4/26/2018 2:30 PM Graham Coburn MD [facility] Integrated Primary Care        Today's Diagnoses     Uncomplicated alcohol dependence (H)    -  1       Follow-ups after your visit        Your next 10 appointments already scheduled     Apr 27, 2018  7:15 AM CDT   Treatment with [facility] E   [facility] ([facility])    [address] [place] [address]   [phone]            Apr 28, 2018  7:15 AM CDT   Treatment with ADULT LODGING PLUS E   [facility] ([facility])    [address] [place] [address]   [phone]            Apr 29, 2018  7:15 AM CDT   Treatment with ADULT LODGING PLUS E   [facility] ([facility])    [address] [place] [address]   [phone]            Apr 30, 2018  7:15 AM CDT   Treatment with ADULT LODGING PLUS E   [facility] ([facility])    [address] [place] [address]   [phone]            May 01, 2018  7:15 AM CDT   Treatment with ADULT LODGING PLUS E   [facility] ([facility])    [address] [place] [address]   [phone]            May 02, 2018  7:15 AM CDT   Treatment with ADULT [facility]ODGING PLUS E   [facility] ([facility])    [address] [place] [address]   [phone]            May 03, 2018  7:15 AM CDT   Treatment with ADULT LODGING PLUS E   [facility]  "([facility])    [address] [address]   [phone]            May 04, 2018  7:15 AM CDT   Treatment with ADULT LODGING PLUS E   [facility] ([facility])    [place] [address] [address]   [phone]            May 05, 2018  7:15 AM CDT   Treatment with ADULT LODGING PLUS E   [facility] ([facility])    [place] [address] [address]   [phone]            May 06, 2018  7:15 AM CDT   Treatment with ADULT LODGING PLUS E   [facility] ([facility])    [facility] [address] [address]   [phone]              Who to contact     If you have questions or need follow up information about today's clinic visit or your schedule please contact [facility] PRIMARY CARE directly at [phone].  Normal or non-critical lab and imaging results will be communicated to you by YouScanhart, letter or phone within 4 business days after the clinic has received the results. If you do not hear from us within 7 days, please contact the clinic through [employer]uent [employer] or phone. If you have a critical or abnormal lab result, we will notify you by phone as soon as possible.  Submit refill requests through Riiid or call your pharmacy and they will forward the refill request to us. Please allow 3 business days for your refill to be completed.          Additional Information About Your Visit        YouScanhart Information     Riiid lets you send messages to your doctor, view your test results, renew your prescriptions, schedule appointments and more. To sign up, go to [email or web address] . Click on \"Log in\" on the left side of the screen, which will take you to the Welcome page. Then click on \"Sign up Now\" on the right side " of the page.     You will be asked to enter the access code listed below, as well as some personal information. Please follow the directions to create your username and password.     Your access code is: [email or web address]  Expires: 7/10/2018  6:27 PM     Your access code will  in 90 days. If you need help or a new code, please call your [facility] clinic or [phone].        Care EveryWhere ID     This is your Care EveryWhere ID. This could be used by other organizations to access your [facility] medical records  [id number]        Your Vitals Were     Pulse Respirations Pulse Oximetry BMI (Body Mass Index)          92 16 97% 18.88 kg/m2         Blood Pressure from Last 3 Encounters:   17 97/62   07 100/72   01/10/07 112/84    Weight from Last 3 Encounters:   18 117 lb (53.1 kg)   17 116 lb (52.6 kg)   07 146 lb 4 oz (66.3 kg)              Today, you had the following     No orders found for display         Today's Medication Changes          These changes are accurate as of 18  2:55 PM.  If you have any questions, ask your nurse or doctor.               Start taking these medicines.        Dose/Directions    naltrexone 50 MG tablet   Commonly known as:  DEPADE;REVIA   Used for:  Uncomplicated alcohol dependence (H)   Started by:  Graham Coburn MD        Dose:  50 mg   Take 1 tablet (50 mg) by mouth daily   Quantity:  30 tablet   Refills:  1            Where to get your medicines      These medications were sent to [facility] [address]  [address] [address]     Phone:  [phone]     naltrexone 50 MG tablet                Primary Care Provider Office Phone # Fax #    [facility] [phone] [address] [address]        Equal Access to Services     NONI HERMAN AH: poonam Avilez, kenisha adames  khris snowhelen malloryelizabeth flower'aan ah. So [facility] [phone].    ATENCIÓN: Si melanie jackson, tiene a nogueira disposición servicios gratuitos de asistencia lingüística. Oneyda al [phone].    We comply with applicable federal civil rights laws and [place] laws. We do not discriminate on the basis of race, color, national origin, age, disability, sex, sexual orientation, or gender identity.            Thank you!     Thank you for choosing [facility] PRIMARY CARE  for your care. Our goal is always to provide you with excellent care. Hearing back from our patients is one way we can continue to improve our services. Please take a few minutes to complete the written survey that you may receive in the mail after your visit with us. Thank you!             Your Updated Medication List - Protect others around you: Learn how to safely use, store and throw away your medicines at www.disposemymeds.org.          This list is accurate as of 4/26/18  2:55 PM.  Always use your most recent med list.                   Brand Name Dispense Instructions for use Diagnosis    ADVIL -38 MG Tabs   Generic drug:  Ibuprofen-Diphenhydramine Cit      Take 1 tablet by mouth nightly as needed (sleep)        alum & mag hydroxide-simethicone 200-200-20 MG/5ML Susp suspension    MYLANTA/MAALOX     Take 30 mLs by mouth every 6 hours as needed for indigestion        buPROPion 150 MG 24 hr tablet    WELLBUTRIN XL    30 tablet    Take 1 tablet (150 mg) by mouth daily    Moderate episode of recurrent major depressive disorder (H)       calcium carbonate 500 MG chewable tablet    TUMS    150 tablet    Take 1 tablet (500 mg) by mouth 3 times daily as needed for heartburn        CERTAVITE/ANTIOXIDANTS Tabs tablet      Take 1 tablet by mouth daily        cetirizine 10 MG tablet    zyrTEC    30 tablet    Take 1 tablet (10 mg) by mouth daily    Chronic allergic rhinitis due to pollen, unspecified seasonality       D3 HIGH POTENCY 1000 units  Caps      Take 1,000 Int'l Units by mouth once a week Approved by PCP on 4/23/2018        fluticasone 50 MCG/ACT spray    FLONASE     Spray 1-2 sprays into both nostrils daily        folic acid 1 MG tablet    FOLVITE     Take 1 mg by mouth daily        guaiFENesin 20 mg/mL Soln solution    ROBITUSSIN     Take 10 mLs by mouth every 4 hours as needed for cough        hydrOXYzine 25 MG tablet    ATARAX    120 tablet    Take 1 tablet (25 mg) by mouth every 4 hours as needed for anxiety    Moderate episode of recurrent major depressive disorder (H)       IBUPROFEN PO      Take 400 mg by mouth every 6 hours as needed for moderate pain        loratadine 10 MG tablet    CLARITIN     Take 10 mg by mouth daily as needed for allergies        MELATONIN PO      Take 3 mg by mouth nightly as needed        naltrexone 50 MG tablet    DEPADE;REVIA    30 tablet    Take 1 tablet (50 mg) by mouth daily    Uncomplicated alcohol dependence (H)       nicotine polacrilex 2 MG lozenge    COMMIT    144 lozenge    Place 1 lozenge (2 mg) inside cheek every hour as needed for smoking cessation    Nicotine dependence       phenol-menthol 14.5 MG lozenge      Place 1 lozenge inside cheek every 2 hours as needed for moderate pain        senna-docusate 8.6-50 MG per tablet    SENOKOT-S;PERICOLACE     Take 2 tablets by mouth daily as needed for constipation        TYLENOL PO      Take 650 mg by mouth every 4 hours as needed for mild pain or fever

## 2018-04-27 ENCOUNTER — HOSPITAL ENCOUNTER (OUTPATIENT)
Dept: BEHAVIORAL HEALTH | Facility: CLINIC | Age: 48
End: 2018-04-27
Attending: FAMILY MEDICINE
Payer: COMMERCIAL

## 2018-04-27 PROCEDURE — H2035 A/D TX PROGRAM, PER HOUR: HCPCS | Mod: HQ

## 2018-04-27 PROCEDURE — 10020000 ZZH LODGING PLUS FACILITY CHARGE ADULT

## 2018-04-28 ENCOUNTER — HOSPITAL ENCOUNTER (OUTPATIENT)
Dept: BEHAVIORAL HEALTH | Facility: CLINIC | Age: 48
End: 2018-04-28
Attending: FAMILY MEDICINE
Payer: COMMERCIAL

## 2018-04-28 PROCEDURE — H2035 A/D TX PROGRAM, PER HOUR: HCPCS | Mod: HQ

## 2018-04-28 PROCEDURE — 10020000 ZZH LODGING PLUS FACILITY CHARGE ADULT

## 2018-04-29 ENCOUNTER — HOSPITAL ENCOUNTER (OUTPATIENT)
Dept: BEHAVIORAL HEALTH | Facility: CLINIC | Age: 48
End: 2018-04-29
Attending: FAMILY MEDICINE
Payer: COMMERCIAL

## 2018-04-29 PROCEDURE — H2035 A/D TX PROGRAM, PER HOUR: HCPCS | Mod: HQ

## 2018-04-29 PROCEDURE — 10020000 ZZH LODGING PLUS FACILITY CHARGE ADULT

## 2018-04-30 ENCOUNTER — HOSPITAL ENCOUNTER (OUTPATIENT)
Dept: BEHAVIORAL HEALTH | Facility: CLINIC | Age: 48
End: 2018-04-30
Attending: FAMILY MEDICINE
Payer: COMMERCIAL

## 2018-04-30 PROCEDURE — 10020000 ZZH LODGING PLUS FACILITY CHARGE ADULT

## 2018-04-30 PROCEDURE — H2035 A/D TX PROGRAM, PER HOUR: HCPCS | Mod: HQ

## 2018-04-30 NOTE — ADDENDUM NOTE
Encounter addended by: Davin Mckeon [facility] on: 4/30/2018 11:54 AM<BR>     Actions taken: Sign clinical note

## 2018-04-30 NOTE — PROGRESS NOTES
Patient:  Ayla Del Toro            Adult CD Progress Note and Treatment Plan Review     Attendance  Please refer to OP BEH CD Adult Attendance Record Documentation Flowsheet    Support group attended this week: yes    Reporting sobriety:  Yes    Treatment Plan     Treatment Plan Review competed on: 4/30/2018      Client preferred learning style:  Visual, verbal, demonstration    Staff Members contributing  GARRETT Nuñez and  GARRETT Gonzalez       Received Supervision: Yes    Client: contributed to goals and plan.    Client received copy of plan/revised plan: Yes    Client agrees with plan/revised plan: Yes    Changes to Treatment Plan: No    New Goals added since last review No    Goals worked on since last review  Continuing stabilization, medication management, spirituality, grief/loss, 1.1 therapy, relapse prevention, abandonment     Strategies effective: yes    Strategies need these changes: Continue to address goals.    1) Care Coordination Activities:  Discussion about aftercare   2) Medical, Mental Health and other appointments the client attended: Dr Coburn   3) Medication issues: prescribed Naltrexone  4) Physical and mental health problems: see Dim 2 and 3  5) Any changes in Vulnerable Adult Status? No changes.  6) Review and evaluation of the individual abuse prevention plan: no individual abuse prevention plan needed      Guide to Risk Ratings for Suicidality:   IDEATION: Active thoughts of suicide? INTENT: Intent to follow on suicide? PLAN: Plan to follow through on suicide? Level of Risk:   IF Yes Yes Yes Patient = High Emergent   IF Yes Yes No Patient = High Urgent/Non-Emergent   IF Yes No No Patient = Moderate Non-Urgent   IF No No   No Patient = Low Risk   The patient's ADDITIONAL RISK FACTORS and lack of PROTECTIVE FACTORS may increase their overall suicide risk ratings.     Patient's/client's current risk rating:  Low Risk      ASAM Risk Rating:    Dimension 1 Risk  0  Pt reports her last use  date as 04/10/18.  Pt denies experiencing symptoms of withdrawal at this time.    Dimension 2 Risk 0  Pt denies any chronic medical issues at this time.  She is able to attend all programming and access medical care as needed.  Pt attended the weekly nursing lecture on 4/29/18.    Dimension 3 Risk 2  Pt reports she is experiencing more emotional connection as time progresses.  Pt reports she is feeling some stress as she considers the future in relation to her children and ongoing legal issues.  Pt shared an assignment on self/shame and reported feeling she does not carry shame.  Following further introspection, Pt was able to provide examples of how she internalized shaming messages from her family and her ex-spouse.  Pt appears able to understand how she holds shame.   She shared an assignment on abandonment and plan to continue 1.1 therapy to aid her in continuing healing abandonment wounds.  Pt has a tendency to participate in some staff splitting. Pt denies thoughts of suicide or self harm at this time.  Pt participated in grief group, facilitated by Maryan Kamara [facility], [facility].  Pt reports she is looking forward to connecting with her therapist, Deb Goldberg.    Dimension 4 Risk  1  Pt reports feeling her motivation is increasing.  She continues to attend all programming and is an active participant.  Pt completed an assignment identifying symptoms of chemical addiction.  She identified positively with 15 of 27 behavioral symptoms, 8 of 13 a emotional symptoms and 13 of 20 physical symptoms.      Dimension 5 Risk 4  Pt rated her cravings at a 2, on a scale of 1 to 10, ten being high.  Pt reports speaking about her cravings has been helpful.  Pt attended relapse prevention workshops and began to identify major triggers/warning signs and new coping skills.  Pt reports she is beginning to practice new coping skills.   Pt participated in the spirituality group, facilitated by Rola Purcell  and Yanet  "GARRETT Solis,  was present during group.  Pt will need to develop a solid relapse prevention plan.     Dimension 6  Risk 3  Pt invited her spouse to attend the family week program, but on this date, indicated to the family counselor her spouse would not be attending, as they have an open and honest relationship.  Pt reports feeling her spouse \"knows everything\" and the family week program would not be beneficial.   Pt to attend the upcoming relationship workshops.    Any changes in Vulnerable Adult Status?  No  If yes, add to treatment plan and individual abuse prevention plan.    Family Involvement:   Pt declined family week this week    Data:   client did participate    Intervention:   Aftercare planning  Counselor feedback  Education  Emotional management  Group feedback  Relapse prevention  Client & counselor reviewed and signed ISP & assessment summary  Mental health education    Assessment:   Stages of Change Model  Contemplation    Appears/Sounds:  Cooperative  Engaged    Plan:  Focus on recovery environment  Monitor emotional/physical health    GARRETT Naranjo        "

## 2018-04-30 NOTE — PROGRESS NOTES
4/30/18  Pt shared an assignment on abandonment providing examples of ways she felt abandoned throughout her childhood.  Pt shared steps she may take to aid in healing abandonment wounds.  Pt reports she plans to return to meeting with her therapist following [facility].

## 2018-05-01 ENCOUNTER — HOSPITAL ENCOUNTER (OUTPATIENT)
Dept: BEHAVIORAL HEALTH | Facility: CLINIC | Age: 48
End: 2018-05-01
Attending: FAMILY MEDICINE
Payer: COMMERCIAL

## 2018-05-01 PROCEDURE — H2035 A/D TX PROGRAM, PER HOUR: HCPCS | Mod: HQ

## 2018-05-01 PROCEDURE — 10020000 ZZH LODGING PLUS FACILITY CHARGE ADULT

## 2018-05-02 ENCOUNTER — HOSPITAL ENCOUNTER (OUTPATIENT)
Dept: BEHAVIORAL HEALTH | Facility: CLINIC | Age: 48
End: 2018-05-02
Attending: FAMILY MEDICINE
Payer: COMMERCIAL

## 2018-05-02 PROCEDURE — 10020000 ZZH LODGING PLUS FACILITY CHARGE ADULT

## 2018-05-02 PROCEDURE — H2035 A/D TX PROGRAM, PER HOUR: HCPCS | Mod: HQ

## 2018-05-03 ENCOUNTER — HOSPITAL ENCOUNTER (OUTPATIENT)
Dept: BEHAVIORAL HEALTH | Facility: CLINIC | Age: 48
End: 2018-05-03
Attending: FAMILY MEDICINE
Payer: COMMERCIAL

## 2018-05-03 PROCEDURE — H2035 A/D TX PROGRAM, PER HOUR: HCPCS | Mod: HQ

## 2018-05-03 PROCEDURE — 10020000 ZZH LODGING PLUS FACILITY CHARGE ADULT

## 2018-05-04 ENCOUNTER — HOSPITAL ENCOUNTER (OUTPATIENT)
Dept: BEHAVIORAL HEALTH | Facility: CLINIC | Age: 48
End: 2018-05-04
Attending: FAMILY MEDICINE
Payer: COMMERCIAL

## 2018-05-04 PROCEDURE — H2035 A/D TX PROGRAM, PER HOUR: HCPCS | Mod: HQ

## 2018-05-04 PROCEDURE — 10020000 ZZH LODGING PLUS FACILITY CHARGE ADULT

## 2018-05-04 NOTE — PROGRESS NOTES
5/4/18  Pt shared an assignment on emotions.  She reports she was confused about emotions throughout childhood as her feelings were not acknowledged and she did not feel she was heard.  Pt shared expressing emotions was not safe and she could not  trust her internal self.  Pt reports working with a therapist has been helpful to her and she is learning ways to identify and express her emotions effectively.  Pt to continue working toward emotional identification, connection and expression

## 2018-05-05 ENCOUNTER — HOSPITAL ENCOUNTER (OUTPATIENT)
Dept: BEHAVIORAL HEALTH | Facility: CLINIC | Age: 48
End: 2018-05-05
Attending: FAMILY MEDICINE
Payer: COMMERCIAL

## 2018-05-05 PROCEDURE — 10020000 ZZH LODGING PLUS FACILITY CHARGE ADULT

## 2018-05-05 PROCEDURE — H2035 A/D TX PROGRAM, PER HOUR: HCPCS | Mod: HQ

## 2018-05-06 ENCOUNTER — HOSPITAL ENCOUNTER (OUTPATIENT)
Dept: BEHAVIORAL HEALTH | Facility: CLINIC | Age: 48
End: 2018-05-06
Attending: FAMILY MEDICINE
Payer: COMMERCIAL

## 2018-05-06 PROCEDURE — 10020000 ZZH LODGING PLUS FACILITY CHARGE ADULT

## 2018-05-06 PROCEDURE — H2035 A/D TX PROGRAM, PER HOUR: HCPCS | Mod: HQ

## 2018-05-07 ENCOUNTER — HOSPITAL ENCOUNTER (OUTPATIENT)
Dept: BEHAVIORAL HEALTH | Facility: CLINIC | Age: 48
End: 2018-05-07
Attending: FAMILY MEDICINE
Payer: COMMERCIAL

## 2018-05-07 ENCOUNTER — OFFICE VISIT (OUTPATIENT)
Dept: BEHAVIORAL HEALTH | Facility: CLINIC | Age: 48
End: 2018-05-07
Payer: COMMERCIAL

## 2018-05-07 VITALS
TEMPERATURE: 98 F | DIASTOLIC BLOOD PRESSURE: 72 MMHG | WEIGHT: 116 LBS | OXYGEN SATURATION: 100 % | BODY MASS INDEX: 18.72 KG/M2 | RESPIRATION RATE: 12 BRPM | SYSTOLIC BLOOD PRESSURE: 114 MMHG

## 2018-05-07 DIAGNOSIS — M79.18 MYOFASCIAL PAIN ON LEFT SIDE: Primary | ICD-10-CM

## 2018-05-07 PROCEDURE — 10020000 ZZH LODGING PLUS FACILITY CHARGE ADULT

## 2018-05-07 PROCEDURE — 99213 OFFICE O/P EST LOW 20 MIN: CPT | Performed by: NURSE PRACTITIONER

## 2018-05-07 PROCEDURE — H2035 A/D TX PROGRAM, PER HOUR: HCPCS | Mod: HQ

## 2018-05-07 RX ORDER — NAPROXEN 500 MG/1
500 TABLET ORAL 2 TIMES DAILY PRN
Qty: 30 TABLET | Refills: 1 | Status: SHIPPED | OUTPATIENT
Start: 2018-05-07

## 2018-05-07 NOTE — PATIENT INSTRUCTIONS
I have referred you to PT to work on your myofascial pain    Please start Naproxen 500mg 2x daily as needed

## 2018-05-07 NOTE — PROGRESS NOTES
SUBJECTIVE:                                                    Ayla Del Toro is a 48 year old female who presents to clinic today for the following health issues:      Back Pain       Duration: awhile but noticed it more since she has been sober, worse at night and wakes her up when she lies on that side         Specific cause: none    Description:   Location of pain: low back left, middle of back left, upper back left, neck both  Character of pain: sharp  Pain radiation:arm, fingers, numbness and tingling worse at night   New numbness or weakness in legs, not attributed to pain:  No, some in arm and fingers    Intensity: Currently 0/10, At its worst 8/10    History:   Pain interferes with job: Not applicable  History of back problems: birth 13  Years ago  Any previous MRI or X-rays: None  Sees a specialist for back pain:  chiropractor and it improved, but never totally went away   Therapies tried without relief: Yoga    Alleviating factors:   Improved by: chiropractor short lived, ibuprofen, stretches short lived, TENs unit        Precipitating factors:  Worsened by: Bending      Problems taking medications regularly: No    Medication side effects: none    Diet: regular (no restrictions)    Social History   Substance Use Topics     Smoking status: Current Some Day Smoker     Packs/day: 0.25     Types: Cigarettes     Last attempt to quit: 5/18/2006     Smokeless tobacco: Never Used      Comment: recreationally     Alcohol use Yes        Problem list and histories reviewed & adjusted, as indicated.  Additional history: as documented    Patient Active Problem List   Diagnosis     Depressive disorder, not elsewhere classified     Anxiety state     Allergic rhinitis due to other allergen     Alcohol abuse     Chemical dependency (H)     Past Surgical History:   Procedure Laterality Date     C TOTAL ABDOM HYSTERECTOMY       HC REMOVE TONSILS/ADENOIDS,<9 Y/O       HC TOOTH EXTRACTION W/FORCEP         Social History    Substance Use Topics     Smoking status: Current Some Day Smoker     Packs/day: 0.25     Types: Cigarettes     Last attempt to quit: 5/18/2006     Smokeless tobacco: Never Used      Comment: recreationally     Alcohol use Yes     Family History   Problem Relation Age of Onset     Depression Mother      CANCER Mother      Ductal carcinoma in situ (DCIS)     CANCER Father      Pseudomyxoma peritonei      Depression Maternal Aunt            Current Outpatient Prescriptions   Medication Sig Dispense Refill     Acetaminophen (TYLENOL PO) Take 650 mg by mouth every 4 hours as needed for mild pain or fever       alum & mag hydroxide-simethicone (MYLANTA/MAALOX) 200-200-20 MG/5ML SUSP suspension Take 30 mLs by mouth every 6 hours as needed for indigestion       buPROPion (WELLBUTRIN XL) 150 MG 24 hr tablet Take 1 tablet (150 mg) by mouth daily 30 tablet 1     calcium carbonate (TUMS) 500 MG chewable tablet Take 1 tablet (500 mg) by mouth 3 times daily as needed for heartburn 150 tablet      cetirizine (ZYRTEC) 10 MG tablet Take 1 tablet (10 mg) by mouth daily 30 tablet 1     Cholecalciferol (D3 HIGH POTENCY) 1000 units CAPS Take 1,000 Int'l Units by mouth once a week Approved by PCP on 4/23/2018       fluticasone (FLONASE) 50 MCG/ACT spray Spray 1-2 sprays into both nostrils daily       folic acid (FOLVITE) 1 MG tablet Take 1 mg by mouth daily       guaiFENesin (ROBITUSSIN) 20 mg/mL SOLN solution Take 10 mLs by mouth every 4 hours as needed for cough       hydrOXYzine (ATARAX) 25 MG tablet Take 1 tablet (25 mg) by mouth every 4 hours as needed for anxiety 120 tablet 1     IBUPROFEN PO Take 400 mg by mouth every 6 hours as needed for moderate pain       Ibuprofen-Diphenhydramine Cit (ADVIL PM) 200-38 MG TABS Take 1 tablet by mouth nightly as needed (sleep)       loratadine (CLARITIN) 10 MG tablet Take 10 mg by mouth daily as needed for allergies       MELATONIN PO Take 3 mg by mouth nightly as needed       multivitamin,  therapeutic with minerals (CERTAVITE/ANTIOXIDANTS) TABS tablet Take 1 tablet by mouth daily       naltrexone (DEPADE;REVIA) 50 MG tablet Take 1 tablet (50 mg) by mouth daily 30 tablet 1     nicotine polacrilex (COMMIT) 2 MG lozenge Place 1 lozenge (2 mg) inside cheek every hour as needed for smoking cessation 144 lozenge 1     phenol-menthol (CEPASTAT) 14.5 MG lozenge Place 1 lozenge inside cheek every 2 hours as needed for moderate pain       senna-docusate (SENOKOT-S;PERICOLACE) 8.6-50 MG per tablet Take 2 tablets by mouth daily as needed for constipation       Allergies   Allergen Reactions     Hydrocodone-Acetaminophen Nausea and Vomiting     Oxycodone Nausea and Vomiting     Recent Labs   Lab Test  04/11/18   0841  04/10/18   1541   ALT  55*  60*   CR  0.57  0.42*   GFRESTIMATED  >90  >90   GFRESTBLACK  >90  >90   POTASSIUM  4.7  3.9      BP Readings from Last 3 Encounters:   11/25/17 97/62   05/22/07 100/72   01/10/07 112/84    Wt Readings from Last 3 Encounters:   04/26/18 117 lb (53.1 kg)   11/25/17 116 lb (52.6 kg)   05/22/07 146 lb 4 oz (66.3 kg)        Labs reviewed in EPIC  Problem list, Medication list, Allergies, and Medical/Social/Surgical histories reviewed in [facility] and updated as appropriate.     ROS: Constitutional, neuro, ENT, endocrine, pulmonary, cardiac, gastrointestinal, genitourinary, musculoskeletal, integument and psychiatric systems are negative, except as otherwise noted above in the HPI.       OBJECTIVE:                                                    /72 (BP Location: Left arm)  Temp 98  F (36.7  C) (Oral)  Resp 12  Wt 116 lb (52.6 kg)  SpO2 100%  BMI 18.72 kg/m2  There is no height or weight on file to calculate BMI.  GENERAL: healthy, alert, well nourished, well hydrated, mild distress, wearing TEN's unit   EYES: Eyes grossly normal to inspection,  NECK:  Tenderness with palpation, decreased ROM, no adenopathy, no asymmetry, no masses, no stiffness; thyroid- normal to  palpation  RESP: lungs clear to auscultation - no rales, no rhonchi, no wheezes  CV: regular rates and rhythm, normal S1 S2, no S3 or S4 and no murmur, no click or rub -  MS: extremities- no gross deformities noted, no edema  SKIN: no suspicious lesions, no rashes  NEURO: strength and tone- normal, sensory exam- grossly normal, mentation- intact, speech- normal,   BACK: no CVA tenderness, no paralumbar tenderness, neck and left shoulder tenderness, slight swelling of trapezius muscle   Mental Status Assessment:  Appearance:   Appropriate   Eye Contact:   Good   Psychomotor Behavior: Normal   Attitude:   Cooperative   Orientation:   All  Speech   Rate / Production: Normal    Volume:  Normal   Mood:    Normal  Affect:    Appropriate   Thought Content:  Clear   Thought Form:  Coherent  Logical   Insight:    Fair   Attention Span and Concentration:  limited  Recent and Remote Memory:  intact  Fund of Knowledge: appropriate  Muscle Strength and Tone: normal   Suicidal Ideation: reports no thoughts, no intention  Hallucination: No  Paranoid-No  Manic-No  Panic-No  Self harm-No      Diagnostic Test Results:  Results for orders placed or performed in visit on 11/25/17   XR Knee Left 3 Views    Narrative    LEFT KNEE THREE VIEWS   11/25/2017 12:01 PM     HISTORY: Knee pain.    COMPARISON: None.    FINDINGS: Negative left knee.      Impression    IMPRESSION: Negative.    JARON NOYOLA MD        ASSESSMENT/PLAN:                                                    ASSESSMENT / PLAN:  (M79.1) Myofascial pain on left side  (primary encounter diagnosis)  Comment: worsening pain snce being sober   Plan: PHYSICAL THERAPY REFERRAL, naproxen (NAPROSYN)         500 MG tablet 2x daily PRN         Referred to PT for eval and treat,  Naproxen PRN     Patient Instructions   I have referred you to PT to work on your myofascial pain    Please start Naproxen 500mg 2x daily as needed     Leighann Pardo, [place], APRN CNP  [place]  [facility]

## 2018-05-07 NOTE — PROGRESS NOTES
Name: Ayla Del Toro  Date: 5/6/2018  Medical Record: [id number]    Envelope Number: [id number]    List of Contents (List each item separately in new row):   One Cell Phone with few small cracks in screen (In[employer] Case purple in color)  One  with Cord(White)     Admission:  I am responsible for any personal items that are not sent to the safe or pharmacy.  [facility] is not responsible for loss, theft or damage of any property in my possession.      Patient Signature:  ___________________________________________       Date/Time:__________________________    Staff Signature: __________________________________       Date/Time:__________________________    2nd Staff person, if patient is unable/unwilling to sign:      __________________________________________________________       Date/Time: __________________________      Discharge:  [facility] has returned all of my personal belongings:    Patient Signature: ________________________________________     Date/Time: ____________________________________    Staff Signature: ______________________________________     Date/Time:_____________________________________

## 2018-05-07 NOTE — MR AVS SNAPSHOT
"              After Visit Summary   5/7/2018    Ayla Del Toro    MRN: [id number]           Patient Information     Date Of Birth          1970        Visit Information        Provider Department      5/7/2018 11:30 AM Leighann Pardo APRN [facility]        Today's Diagnoses     Myofascial pain on left side    -  1      Care Instructions    I have referred you to PT to work on your myofascial pain    Please start Naproxen 500mg 2x daily as needed           Follow-ups after your visit        Additional Services     PHYSICAL THERAPY REFERRAL       *This therapy referral will be filtered to a centralized scheduling office at [facility] and the patient will receive a call to schedule an appointment at a [facility] location most convenient for them. *     [facility] provides Physical Therapy evaluation and treatment and many specialty services across the [facility] system.  If requesting a specialty program, please choose from the list below.    If you have not heard from the scheduling office within 2 business days, please call [phone] for all locations, with the exception of [place], please call [phone] and [facility], please call [phone]  Treatment: Evaluation & Treatment  Special Instructions/Modalities: Myofascial release  Special Programs: neck and scapula pain, myofascial pain     Please be aware that coverage of these services is subject to the terms and limitations of your health insurance plan.  Call member services at your health plan with any benefit or coverage questions.      **Note to Provider:  If you are referring outside of [facility] for the therapy appointment, please list the name of the location in the \"special instructions\" above, print the referral and give to the patient to schedule the appointment.                  Your next 10 appointments already scheduled     May 08, 2018  7:15 AM CDT "   Treatment with ADULT LODGING PLUS E   [facility] ([facility])    [address] [place] [address]   [phone]            May 09, 2018  7:15 AM CDT   Treatment with ADULT LODGING PLUS E   [facility] ([facility])    [address] [place] [address]   [phone]            May 10, 2018  7:15 AM CDT   Treatment with ADULT LODGING PLUS E   [facility] ([facility])    [address] [place] [address]   [phone]            May 11, 2018  7:15 AM CDT   Treatment with ADULT LODGING PLUS E   [facility] ([facility])    [address] [place] [address]   [phone]            May 12, 2018  7:15 AM CDT   Treatment with ADULT LODGING PLUS E   [facility] ([facility])    [address] [place] [address]   [phone]            May 13, 2018  7:15 AM CDT   Treatment with ADULT LODGING PLUS E   [facility] ([facility])    [address] [place] [address]   [phone]            May 24, 2018  2:15 PM CDT   New Visit with Graham Coburn MD   [facility] ([facility])    [address]  [address]  [facility] [address] [phone]              Who to contact     If you have questions or need follow up information about today's clinic visit or your schedule please contact [facility] directly at [phone].  Normal or non-critical lab and imaging results will be communicated to you by MyChart, letter or phone within 4 business days after the clinic has  "received the results. If you do not hear from us within 7 days, please contact the clinic through Skyrider or phone. If you have a critical or abnormal lab result, we will notify you by phone as soon as possible.  Submit refill requests through Skyrider or call your pharmacy and they will forward the refill request to us. Please allow 3 business days for your refill to be completed.          Additional Information About Your Visit        Skyrider Information     Skyrider lets you send messages to your doctor, view your test results, renew your prescriptions, schedule appointments and more. To sign up, go to [email or web address] . Click on \"Log in\" on the left side of the screen, which will take you to the Welcome page. Then click on \"Sign up Now\" on the right side of the page.     You will be asked to enter the access code listed below, as well as some personal information. Please follow the directions to create your username and password.     Your access code is: [email or web address]  Expires: 7/10/2018  6:27 PM     Your access code will  in 90 days. If you need help or a new code, please call your [facility] clinic or [phone].        Care EveryWhere ID     This is your Care EveryWhere ID. This could be used by other organizations to access your [facility] medical records  [id number]        Your Vitals Were     Temperature Respirations Pulse Oximetry BMI (Body Mass Index)          98  F (36.7  C) (Oral) 12 100% 18.72 kg/m2         Blood Pressure from Last 3 Encounters:   18 114/72   17 97/62   07 100/72    Weight from Last 3 Encounters:   18 116 lb (52.6 kg)   18 117 lb (53.1 kg)   17 116 lb (52.6 kg)              We Performed the Following     PHYSICAL THERAPY REFERRAL          Today's Medication Changes          These changes are accurate as of 18 12:11 PM.  If you have any questions, ask your nurse or doctor.               Start taking these medicines.        " Dose/Directions    naproxen 500 MG tablet   Commonly known as:  NAPROSYN   Used for:  Myofascial pain on left side   Started by:  Leighann Pardo APRN CNP        Dose:  500 mg   Take 1 tablet (500 mg) by mouth 2 times daily as needed for moderate pain   Quantity:  30 tablet   Refills:  1            Where to get your medicines      These medications were sent to [facility] [address]  [address] [facility] [address] [address]     Phone:  [phone]     naproxen 500 MG tablet                Primary Care Provider Office Phone # Fax #    [facility] [phone] [address] [facility] [address]        Equal Access to Services     NONI HERMAN : Jordan Potts, waaxda luqadaha, qaybta kaalmada adeegyada, kenisha pinto. So [facility] [phone].    ATENCIÓN: Si habla español, tiene a nogueira disposición servicios gratuitos de asistencia lingüística. [facility] [phone].    We comply with applicable federal civil rights laws and [place] laws. We do not discriminate on the basis of race, color, national origin, age, disability, sex, sexual orientation, or gender identity.            Thank you!     Thank you for choosing [facility]  for your care. Our goal is always to provide you with excellent care. Hearing back from our patients is one way we can continue to improve our services. Please take a few minutes to complete the written survey that you may receive in the mail after your visit with us. Thank you!             Your Updated Medication List - Protect others around you: Learn how to safely use, store and throw away your medicines at www.disposemymeds.org.          This list is accurate as of 5/7/18 12:11 PM.  Always use your most recent med list.                   Brand Name Dispense Instructions for use Diagnosis    ADVIL -38 MG Tabs    Generic drug:  Ibuprofen-Diphenhydramine Cit      Take 1 tablet by mouth nightly as needed (sleep)        alum & mag hydroxide-simethicone 200-200-20 MG/5ML Susp suspension    MYLANTA/MAALOX     Take 30 mLs by mouth every 6 hours as needed for indigestion        buPROPion 150 MG 24 hr tablet    WELLBUTRIN XL    30 tablet    Take 1 tablet (150 mg) by mouth daily    Moderate episode of recurrent major depressive disorder (H)       calcium carbonate 500 MG chewable tablet    TUMS    150 tablet    Take 1 tablet (500 mg) by mouth 3 times daily as needed for heartburn        CERTAVITE/ANTIOXIDANTS Tabs tablet      Take 1 tablet by mouth daily        cetirizine 10 MG tablet    zyrTEC    30 tablet    Take 1 tablet (10 mg) by mouth daily    Chronic allergic rhinitis due to pollen, unspecified seasonality       D3 HIGH POTENCY 1000 units Caps      Take 1,000 Int'l Units by mouth once a week Approved by PCP on 4/23/2018        fluticasone 50 MCG/ACT spray    FLONASE     Spray 1-2 sprays into both nostrils daily        folic acid 1 MG tablet    FOLVITE     Take 1 mg by mouth daily        guaiFENesin 20 mg/mL Soln solution    ROBITUSSIN     Take 10 mLs by mouth every 4 hours as needed for cough        hydrOXYzine 25 MG tablet    ATARAX    120 tablet    Take 1 tablet (25 mg) by mouth every 4 hours as needed for anxiety    Moderate episode of recurrent major depressive disorder (H)       IBUPROFEN PO      Take 400 mg by mouth every 6 hours as needed for moderate pain        loratadine 10 MG tablet    CLARITIN     Take 10 mg by mouth daily as needed for allergies        MELATONIN PO      Take 3 mg by mouth nightly as needed        naltrexone 50 MG tablet    DEPADE;REVIA    30 tablet    Take 1 tablet (50 mg) by mouth daily    Uncomplicated alcohol dependence (H)       naproxen 500 MG tablet    NAPROSYN    30 tablet    Take 1 tablet (500 mg) by mouth 2 times daily as needed for moderate pain    Myofascial pain on left side        nicotine polacrilex 2 MG lozenge    COMMIT    144 lozenge    Place 1 lozenge (2 mg) inside cheek every hour as needed for smoking cessation    Nicotine dependence       phenol-menthol 14.5 MG lozenge      Place 1 lozenge inside cheek every 2 hours as needed for moderate pain        senna-docusate 8.6-50 MG per tablet    SENOKOT-S;PERICOLACE     Take 2 tablets by mouth daily as needed for constipation        TYLENOL PO      Take 650 mg by mouth every 4 hours as needed for mild pain or fever

## 2018-05-08 ENCOUNTER — HOSPITAL ENCOUNTER (OUTPATIENT)
Dept: BEHAVIORAL HEALTH | Facility: CLINIC | Age: 48
End: 2018-05-08
Attending: FAMILY MEDICINE
Payer: COMMERCIAL

## 2018-05-08 PROCEDURE — 10020000 ZZH LODGING PLUS FACILITY CHARGE ADULT

## 2018-05-08 PROCEDURE — H2035 A/D TX PROGRAM, PER HOUR: HCPCS | Mod: HQ

## 2018-05-08 NOTE — PROGRESS NOTES
Patient:  Ayla Del Toro              Adult CD Progress Note and Treatment Plan Review     Attendance  Please refer to OP BEH CD Adult Attendance Record Documentation Flowsheet    Support group attended this week: yes    Reporting sobriety:  Yes    Treatment Plan     Treatment Plan Review competed on: 5/8/2018      Client preferred learning style:  Verbal, visual, demonstration    Staff Members contributing  Davin Mckeon [facility] and  MARY Gonzalez, Sissy Broussard [facility]       Received Supervision: Yes    Client: contributed to goals and plan.    Client received copy of plan/revised plan: Yes    Client agrees with plan/revised plan: Yes    Changes to Treatment Plan: No    New Goals added since last review No    Goals worked on since last review relapse prevention, spirituality, grief/loss, esteem building, aftercare planning, emotional management,  anger/resentment    Strategies effective: yes    Strategies need these changes: Continue to address goals.    1) Care Coordination Activities:  Pt seeking alternative aftercare program - Protestant centered - [facility]  2) Medical, Mental Health and other appointments the client attended:  Pt visited [facility] for [facility] pain   3) Medication issues: prescribed Naproxen and provided physical therapy referral  4) Physical and mental health problems: See Dim 2 and 3  5) Any changes in Vulnerable Adult Status?  No  changes  6) Review and evaluation of the individual abuse prevention plan: no additional prevention plan needed      Guide to Risk Ratings for Suicidality:   IDEATION: Active thoughts of suicide? INTENT: Intent to follow on suicide? PLAN: Plan to follow through on suicide? Level of Risk:   IF Yes Yes Yes Patient = High Emergent   IF Yes Yes No Patient = High Urgent/Non-Emergent   IF Yes No No Patient = Moderate Non-Urgent   IF No No   No Patient = Low Risk   The patient's ADDITIONAL RISK FACTORS and lack of PROTECTIVE FACTORS may increase their overall suicide risk  ratings.     Patient's/client's current risk rating:  Low risk      ASAM Risk Rating:    Dimension 1 Risk 1  Pt reports last use as 4/10/18.  Pt denies experiencing withdrawal symptoms at this time.    Dimension 2 Risk 0   Pt reports experiencing some back discomfort and was seen at the [facility].  She was prescribed Naproxen and provided Physical Therapy referral.  Pt is able to attend all programming and access medical care as needed.      Dimension 3 Risk  2  Pt reports feeling a emotionally detached.  Pt reports she reached out to her daughters and they responded saying they did not want to engage with her at this time.  Pt became tearful when sharing about her daughters and she was provided support and encouragement.  Pt identified coping skills to include breathing exercises, yoga, walking, exercising,  using essential oils  and spending time with peers. Pt has been encouraged to continue to address feelings around the response she received from her daughters.  Pt participated in grief group, facilitated by NILES Neal, [facility]. Pt denies any thoughts of suicide or self harm at this time. Pt plans to return to 1.1 therapy with therapist Deb Goldberg.    Dimension 4 Risk 1  Pt verbalizes her need for sobreity.  She continues to attend all programming and participates actively.  Pt spends time with her peers and appears supportive of them.  She is respectful to all staff and follows directives of the program. .     Dimension 5 Risk  4   Pt denies experiencing cravings at this time.  She is addressing self-defeating/self-sabotaging patterns.  Pt rated her cravings at a 2, on a scale of 1 to 10, ten being high.  Pt shared an assignment focusing on anger/resentment.  She identified resentments she carries and steps she can take to let go.  Pt completed a co-dependency checklist resulting in a moderate to severe level of co-dependency.  Pt read materials on boundaries and listed ways she can begin to set healthy  "limits with others.  Pt completed an assignment on perfectionism and control.  She reports she is working toward accepting her limitations and practicing the affirmation \"I am enough.\"   Pt reports she is in a [employer] study and incorporating new techniques such as  The A,B, C,D method, writing and re-framing negative self talk. Pt participated in the spirituality group, facilitated by Rola Purcell  and GARRETT Hadley, was present during group.  Pt is seeking an outpatient program through [facility].    Dimension 6  Risk 3  Pt plans to return to her home following [facility] completion.  Pt is spending free time with female peers and attending 2+ 12-step meetings weekly while in .      Any changes in Vulnerable Adult Status?  No  If yes, add to treatment plan and individual abuse prevention plan.    Family Involvement:   Pt did not participate in family week.  Her spouse was invited, but did not attend    Data:   client did participate  Pt attended Lecture presented by the LP nurse    Intervention:   Aftercare planning  Counselor feedback  Education  Emotional management  Group feedback  Relapse prevention  Client & counselor reviewed and signed ISP & assessment summary    Assessment:   Stages of Change Model  Contemplation  Preparation/Determination    Appears/Sounds:  Cooperative  Engaged    Plan:  Focus on recovery environment  Monitor emotional/physical health      GARRETT Naranjo          "

## 2018-05-09 ENCOUNTER — HOSPITAL ENCOUNTER (OUTPATIENT)
Dept: BEHAVIORAL HEALTH | Facility: CLINIC | Age: 48
End: 2018-05-09
Attending: FAMILY MEDICINE
Payer: COMMERCIAL

## 2018-05-09 PROCEDURE — H2035 A/D TX PROGRAM, PER HOUR: HCPCS | Mod: HQ

## 2018-05-09 PROCEDURE — 10020000 ZZH LODGING PLUS FACILITY CHARGE ADULT

## 2018-05-10 ENCOUNTER — HOSPITAL ENCOUNTER (OUTPATIENT)
Dept: BEHAVIORAL HEALTH | Facility: CLINIC | Age: 48
End: 2018-05-10
Attending: FAMILY MEDICINE
Payer: COMMERCIAL

## 2018-05-10 PROCEDURE — 10020000 ZZH LODGING PLUS FACILITY CHARGE ADULT

## 2018-05-10 PROCEDURE — H2035 A/D TX PROGRAM, PER HOUR: HCPCS | Mod: HQ

## 2018-05-10 NOTE — PROGRESS NOTES
Name: Ayla Del Toro  Date: 5/10/2018  Medical Record: [id number]    Envelope Number: [id number]    List of Contents (List each item separately in new row):   1 cell phone w/ purple case and a     Admission:  I am responsible for any personal items that are not sent to the safe or pharmacy.  [facility] is not responsible for loss, theft or damage of any property in my possession.      Patient Signature:  ___________________________________________       Date/Time:__________________________    Staff Signature: __________________________________       Date/Time:__________________________    2nd Staff person, if patient is unable/unwilling to sign:      __________________________________________________________       Date/Time: __________________________      Discharge:  [facility] has returned all of my personal belongings:    Patient Signature: ________________________________________     Date/Time: ____________________________________    Staff Signature: ______________________________________     Date/Time:_____________________________________

## 2018-05-11 ENCOUNTER — HOSPITAL ENCOUNTER (OUTPATIENT)
Dept: BEHAVIORAL HEALTH | Facility: CLINIC | Age: 48
End: 2018-05-11
Attending: FAMILY MEDICINE
Payer: COMMERCIAL

## 2018-05-11 PROCEDURE — H2035 A/D TX PROGRAM, PER HOUR: HCPCS | Mod: HQ

## 2018-05-11 PROCEDURE — 10020000 ZZH LODGING PLUS FACILITY CHARGE ADULT

## 2018-05-11 NOTE — IP AVS SNAPSHOT
"                  MRN:[id number]                      After Visit Summary   5/11/2018    Ayla Del Toro    MRN: [id number]           Visit Information        Provider Department      5/11/2018  7:15 AM ADULT LODGING PLUS E [facility]        Your next 10 appointments already scheduled     May 12, 2018  7:15 AM CDT   Treatment with ADULT LODGING PLUS E   [facility] ([facility])    [address] [place] [address]   [phone]            May 13, 2018  7:15 AM CDT   Treatment with ADULT LODGING PLUS E   [facility] ([facility])    [facility] [address] [address]   [phone]            May 18, 2018  9:50 AM CDT   (Arrive by 9:35 AM)   RODRIGO Extremity with Barrie Thomas PT   [facility] ([facility])    [address] [place] [address]   [phone]            May 24, 2018  2:15 PM CDT   New Visit with Graham Coburn MD   [facility] ([facility])    [address] [place] [address] So  [address]  [facility] [address]   [phone]            May 25, 2018  1:40 PM CDT   RODRIGO Extremity with [facility] ([facility])    [address] [place] [address]   [phone]            Jun 01, 2018  1:40 PM CDT   RODRIGO Extremity with Barrie Thomas PT   [facility] ([facility])    [address] [place] [address]   [phone]              MyChart Information     Cursehart lets you send messages to your doctor, view your test results, renew your prescriptions, schedule appointments and more. To sign up, go to [email or web address] . Click on \"Log in\" on the left side of the screen, which will take you to the Welcome page. Then click on \"Sign up " "Now\" on the right side of the page.     You will be asked to enter the access code listed below, as well as some personal information. Please follow the directions to create your username and password.     Your access code is: [email or web address]  Expires: 7/10/2018  6:27 PM     Your access code will  in 90 days. If you need help or a new code, please call your [facility] clinic or [phone].        Care EveryWhere ID     This is your Care EveryWhere ID. This could be used by other organizations to access your [facility] medical records  [id number]        Equal Access to Services     NONI HERMAN : Jordan Potts, poonam andrews, tu fraser, kenisha pinto. So [facility] [phone].    ATENCIÓN: Si habla español, tiene a nogueira disposición servicios gratuitos de asistencia lingüística. Llame al [phone].    We comply with applicable federal civil rights laws and [place] laws. We do not discriminate on the basis of race, color, national origin, age, disability, sex, sexual orientation, or gender identity.            "

## 2018-05-11 NOTE — IP AVS SNAPSHOT
Medication List       Patient:  DORIS DOBSON   :  1970   Physician:  Scott Fenton           This is your record.  Keep this with you and show to your community pharmacist(s) and physician(s) at each visit.     Allergies:  HYDROCODONE-ACETAMINOPHEN - Nausea and Vomiting     OXYCODONE - Nausea and Vomiting               Medications  Valid as of: May 11, 2018 - 10:00 AM    Generic Name Brand Name Tablet Size Instructions for use    BuPROPion HCl WELLBUTRIN  MG Take 1 tablet (150 mg) by mouth daily        Calcium Carbonate Antacid TUMS 500 MG Take 1 tablet (500 mg) by mouth 3 times daily as needed for heartburn        Cetirizine HCl zyrTEC 10 MG Take 1 tablet (10 mg) by mouth daily        Cholecalciferol D3 HIGH POTENCY 1000 units Take 1,000 Int'l Units by mouth once a week Approved by PCP on 2018        Fluticasone Propionate FLONASE 50 MCG/ACT Spray 1-2 sprays into both nostrils daily        Folic Acid FOLVITE 1 MG Take 1 mg by mouth daily        HydrOXYzine HCl ATARAX 25 MG Take 1 tablet (25 mg) by mouth every 4 hours as needed for anxiety        Ibuprofen-Diphenhydramine Cit Ibuprofen-Diphenhydramine Cit 200-38 MG Take 1 tablet by mouth nightly as needed (sleep)        Multiple Vitamins-Minerals THERA-VIT-M  Take 1 tablet by mouth daily        Naltrexone HCl DEPADE;REVIA 50 MG Take 1 tablet (50 mg) by mouth daily        Naproxen NAPROSYN 500 MG Take 1 tablet (500 mg) by mouth 2 times daily as needed for moderate pain        Nicotine Polacrilex COMMIT 2 MG Place 1 lozenge (2 mg) inside cheek every hour as needed for smoking cessation        .           .           .           .

## 2018-05-11 NOTE — PROGRESS NOTES
Nursing Discharge Planning Meeting    Writer completed discharge planning meeting with patient. Discharge is planned for Monday, 5/14/2018.    Discussed appropriate follow up care to manage OLLIE and MH and to obtain medication refills. Patient given a copy of their current medications for reference. Questions answered and patient verbalized understanding of post-discharge follow up plan.  Patient has appt with PCP on 5/17/2018. Also has acupuncture appt set up as well as PT sessions back/spine/neck area and f/u appt with Dr. Coburn on 5/24    Continue to support patient in discharge planning as needed to assure appropriate continuity of care.     Tobacco Cessation  Patient participated in the nicotine replacement therapy for tobacco cessation or reduction during their treatment programming: Yes    The patient was provided with community resources for follow-up to continue tobacco cessation support once in the community. Also the patient was encoruaged to discuss their tobacco cessation efforts with the primary care provider.

## 2018-05-12 ENCOUNTER — HOSPITAL ENCOUNTER (OUTPATIENT)
Dept: BEHAVIORAL HEALTH | Facility: CLINIC | Age: 48
End: 2018-05-12
Attending: FAMILY MEDICINE
Payer: COMMERCIAL

## 2018-05-12 PROCEDURE — 10020000 ZZH LODGING PLUS FACILITY CHARGE ADULT

## 2018-05-12 PROCEDURE — H2035 A/D TX PROGRAM, PER HOUR: HCPCS | Mod: HQ

## 2018-05-13 ENCOUNTER — HOSPITAL ENCOUNTER (OUTPATIENT)
Dept: BEHAVIORAL HEALTH | Facility: CLINIC | Age: 48
End: 2018-05-13
Attending: FAMILY MEDICINE
Payer: COMMERCIAL

## 2018-05-13 PROCEDURE — 10020000 ZZH LODGING PLUS FACILITY CHARGE ADULT

## 2018-05-13 PROCEDURE — H2035 A/D TX PROGRAM, PER HOUR: HCPCS | Mod: HQ

## 2018-05-14 NOTE — PROGRESS NOTES
MICD Discharge Summary/Instructions     Patient: Ayla Del Toro  MRN: [id number]   : 1970 Age: 48 year old Sex: female   -  Focus of Treatment / Discharge Recommendations    Personal Safety/ Management of Symptoms  * Follow your safety plan.  Report increased symptoms to your care team and /or go to the nearest Emergency Department.  * Call crisis lines as needed  [facility] [phone]                [facility] [phone]  [facility] [phone]              [facility] [phone]  [facility] [phone]              [facility] [phone]  [facility] [phone]          National Suicide Prevention [phone]  [facility] [phone]            Suicide Prevention [phone]  [facility] [phone]    Abstinence/Relapse Prevention  * Take all medicines as directed.  Carry a current list of medicines with you.  * Use coping skills: nutrition, rest, exercise, journal, meditation, yoga, seek sober support, engage in activities you enjoy  * Do not use illicit (street) drugs, controlled substances (narcotics) or alcohol.    Develop/Improve Independent Living/Socialization Skills: ensure living environment is conducive to recovery    Community Resources/Supports and Discharge Planning:  Maintain abstinence from all mood altering substances, attend 2+ sober support groups per week, maintain contact with a sponsor,  or alternative support person, continue with [facility], continue 1.1 therapy, maintain good physical and mental health care (acupuncture)  Follow up with psychiatrist / main caregiver: Dr Davin Groves, MILENA Pablo, Dr Graham Coburn     Visits:  and 18   Follow up with your therapist: Deb Goldberg    Go to group therapy and / or support groups at: [facility]     Client Signature:_______________________   Date / Time:___________  Staff Signature:________________________   Date / Time:___________

## 2018-05-14 NOTE — PROGRESS NOTES
5/14/18  Pt shared her relapse prevention plan and received a medallion from peers for program completion.  Pt met with counselor to review her plan for ongoing care.  Pt appears prepared for discharge at this time. Pt reports she has 3 [facility] support group meetings set up.  Pt plans to meet with her therapist Deb Goldberg.  Pt discharged this date to her home.

## 2018-05-14 NOTE — PROGRESS NOTES
[address] [place] [address]        Ayla Del Toro, 1970, was admitted for evaluation/treatment of chemical dependency at [facility].  This person took part in these program(s):    ______ The Inpatient Program   ______ The Outpatient Program   __x___ The [facility] Program   ______ [facility]ging [facility] Outpatient       Date admitted: 04/16/18  Date discharged: 05/14/18    Type of discharge:   __x___ Satisfactory - completed evaluation / treatment   ______ Discharged without completing   ______ Behavioral discharge   ______ Transferred to another chemical dependency program   ______ Transferred to another type of service   ______ Left against medical advice (AMA) / Eloped       Comments: To continue with [facility]      Counselor: GARRETT Naranjo                       Date: 5/14/2018             Time: 7:48 AM

## 2018-05-16 NOTE — ADDENDUM NOTE
Encounter addended by: Sissy Broussard LADC on: 5/16/2018 12:10 PM<BR>     Actions taken: Sign clinical note

## 2018-05-16 NOTE — DISCHARGE SUMMARY
CHEMICAL DEPENDENCY DISCHARGE SUMMARY   NAME: Alyssa Del Toro  : 1970    MR #  [id number]     EVALUATION COUNSELOR: Espinoza Stanley MA [facility]  TREATMENT COUNSELOR:  Davin Mckeon [facility]; Alexandra Cano [facility]; Sissy Broussard [facility]    REFERRAL SOURCE:  Self  PROGRAM:  Adult Chemical Dependency [facility]    ADMISSION DATE:  2018  LAST SESSION DATE:  18  DISCHARGE DATE: 18    ADMISSION DIAGNOSIS:    F10.20 Alcohol use disorder, severe       DISCHARGE DIAGNOSIS:   F10.20 Alcohol use disorder, severe                 DISCHARGE STATUS: Patient was discharged after completing 28 days in [facility] chemical dependency treatment.     LAST USE DATE:  4/10/18    DAYS OF TREATMENT COMPLETED:  28     PRESENTING INFORMATION:  Patient presents to [facility] for evaluation and detox from alcohol.  Pt reports she has a long history of alcoholism, and drinks approximately 1.75 L of wine a day.     SERVICES PROVIDED:  Services included assessment, treatment planning, and education regarding chemical dependency, mental illness, and relapse prevention. Patient participated in recovery-oriented workshops, spiritual care counseling, recovery skills training, and aftercare planning.    ISSUES ADDRESSED IN TREATMENT:    DIMENSION 1/ACUTE WITHDRAWAL ISSUES/DETOX:    Admit RR: 0       DC RR:  0  At time of discharge, patient s last use date was reported as 4/10/2018.  Patient denied any symptoms of alcohol withdrawal while in treatment.     DIMENSION 2/BIOMEDICAL:  Admit RR:   0          DC RR: 0  At time of admission, patient denied any biomedical concerns that would prevent her from participating in treatment programming.  Patient had a physical health screen on 18 while admitted to detox unit. Prior to discharge, patient met with Dr. Coburn and was prescribed naltrexone for medication assisted therapy.    DIMENSION 3/EMOTIONAL/BEHAVIORAL:   Admit RR:   2   DC RR:  2  Patient reported a historical mental  health diagnosis of bi-polar disorder. Patient also reported symptoms of anxiety and depression. Patient participated in a suicide risk screening on admission, and her assessment rating was  present, non-urgent.  Patient completed a required safety plan the first week of treatment with her primary counselor. Patient denied suicidal or homicidal ideation while in treatment.   Patient completed her  Book of Me  assignment and presented in group. Patient demonstrated the ability to turn her negative self-talk into positive affirmations and began to practice daily meditation while in treatment. Patient attended weekly grief group and began the process of healing from relationship loss due to her addiction. Patient also participated in the Alcohol and Anxiety study and learned to develop coping skills for anxiety management. Patient took the opportunity to meet with staff therapist GAVINO Loredo for an individual therapy appointment, and will continue to meet with her community therapist regularly.     DIMENSION 4/READINESS TO CHANGE:  Admit RR:  2        DC RR:  1  Patient verbalizes her need for sobriety. She attended all programming and was an active participant. Patient completed her  Consequences  assignment and identified how her alcohol use has contributed to violating her personal value system. Patient s internal motivation was increased by creating a collage of what her life will look like in five years sober vs. still using. Patient appears to be in the preparation stage of change at this time.     DIMENSION 5/RELAPSE & CONTINUED PROBLEM POTENTIAL:  Admit RR:     4   DC RR:    3  Patient worked on assignments to identify her relapse patterns and practice skills for emotional regulation. Patient completed an assignment on emotional maturity and identified resentments she carries and steps she can take to let go.  Pt completed a co-dependency checklist resulting in a moderate to severe level of  "co-dependency.  Pt read materials on boundaries and listed ways she can begin to set healthy limits with others.  Pt completed an assignment on perfectionism and control.  She reports she is working toward accepting her limitations and practicing the affirmation \"I am enough.\" Patient was encouraged to continue to explore inner child work through individual therapy participation. Patient participated in a weekend workshop on relapse prevention and completed a relapse prevention plan prior to discharge.     DIMENSION 6/RECOVERY ENVIRONMENT: Admit RR      4      DC RR:  2  Patient attended weekly 12-step support group meetings and worked to build supportive relationships while in treatment.  Patient also sought alternative care to the 12-steps and began practicing Scientology centered recovery meditations.  Patient worked to secure a part-time work position beginning in June 2018 and developed a plan for daily structured activity. Patient plans to continue her Holiness recovery practice through [facility] meetings and obtain sponsorship.     STRENGTHS: The patient appeared sincere in her desire to establish a recovery lifestyle, put forth consistent effort in reaching treatment plan goals and following staff recommendations, expressed genuine concern for other group members and freely offered support as well as encouragement, gained considerable insight into relapse prevention strategies and resources which can be utilized in recovery.     PROGNOSIS:   1. Client has a favorable prognosis assuming that he/she follows all the aftercare recommendations and continues to build sober support network.      LIVING ARRANGEMENTS AT DISCHARGE:  Patient will discharge to home.       CONTINUING CARE RECOMMENDATIONS/REFERRALS:   1. Remain abstinent from all mood altering chemicals.  2. Attend a minimum of two [facility] meetings weekly.  3. Follow all recommendations of primary healthcare provider.  4. Continue to practice " coping skills for emotional and chemical health relapse prevention.

## 2018-05-18 ENCOUNTER — THERAPY VISIT (OUTPATIENT)
Dept: PHYSICAL THERAPY | Facility: CLINIC | Age: 48
End: 2018-05-18
Payer: COMMERCIAL

## 2018-05-18 DIAGNOSIS — M54.2 CERVICAL PAIN: ICD-10-CM

## 2018-05-18 DIAGNOSIS — G89.29 CHRONIC LEFT-SIDED LOW BACK PAIN, WITH SCIATICA PRESENCE UNSPECIFIED: ICD-10-CM

## 2018-05-18 DIAGNOSIS — M54.5 CHRONIC LEFT-SIDED LOW BACK PAIN, WITH SCIATICA PRESENCE UNSPECIFIED: ICD-10-CM

## 2018-05-18 PROCEDURE — 97110 THERAPEUTIC EXERCISES: CPT | Mod: GP | Performed by: PHYSICAL THERAPIST

## 2018-05-18 PROCEDURE — 97161 PT EVAL LOW COMPLEX 20 MIN: CPT | Mod: GP | Performed by: PHYSICAL THERAPIST

## 2018-05-18 NOTE — PROGRESS NOTES
[facility] Initial Evaluation    Subjective:  Ayla Del Toro is a 48 year old female with a neck and low back condition. Symptoms commenced as a result of: unknown cause. Condition occurred in the following environment: unknown cause. Onset of symptoms: April 2018. Location of symptoms: left sided low back, thoracic and cervical spine pain. Pain level on number scale: 8/10. Quality of pain: sharp. Associated symptoms: stiffness. Pain frequency (constant/intermittent): intermittent. Pain worse (day/night/same all the time/AM/PM): night. Symptoms are exacerbated by: bending, lifting, reaching, lying, pushing, sleeping, reading. Symptoms are relieved by: short term relief from chiro, ibuprofen, stretches. Progression of symptoms since onset (same/better/worse): worse. Special tests (x-ray, MRI, CT scan, EMG, bone scan): see Epic. Previous treatment: chiro. Improvement with previous treatment: temporary relief. General health as reported by patient is good. Pertinent medical history includes: See Epic. Medical allergies: see Epic. Other pertinent surgeries: see Epic. Current medications: See Epic. Occupation: unemployed. Patient is (working in normal job without restrictions/working in normal job with restrictions/working in an alternate job/not working due to present treatment problem): NA. Primary job tasks: NA. Barriers at home/work: None reported by patient. Red flags: None reported by patient.    Objective  Posture     Sitting (good/fair/poor):  poor  Standing (good/fair/poor):  poor  Protruded head (yes/no):  yes  Wry neck (right/left/nil):  no  Relevant wry neck (yes/no):  no  Correction of posture (better/worse/no effect): better  Other observations:  Scapular winging    Cervical movement Loss Marco Antonio Mod Min Nil Pain   Protrusion    x    Flexion    x    Retraction   x  +   Extension   x  +   Rotation Left   x  +   Rotation Right   x     Lateral Flexion Right    x    Lateral Flexion Left   x  +      Lumbar movement Loss Marco Antonio Mod Min Nil Pain   Flexion   x  +   Extension  x   +   Side Gliding L    x    Side Gliding R   x  +     Assessment/Plan:    Patient is a 48 year old female with cervical, thoracic and lumbar complaints.    Patient has the following significant findings with corresponding treatment plan.                Diagnosis 1:  Neck and LBP  Pain -  hot/cold therapy, manual therapy, STS, splint/taping/bracing/orthotics, self management, education, directional preference exercise and home program  Decreased ROM/flexibility - manual therapy and therapeutic exercise  Decreased joint mobility - manual therapy and therapeutic exercise  Decreased strength - therapeutic exercise and therapeutic activities  Impaired muscle performance - neuro re-education  Decreased function - therapeutic activities  Impaired posture - neuro re-education    Therapy Evaluation Codes:   1) History comprised of:   Personal factors that impact the plan of care:      None.    Comorbidity factors that impact the plan of care are:      None.     Medications impacting care: None.  2) Examination of Body Systems comprised of:   Body structures and functions that impact the plan of care:      Cervical spine, Lumbar spine and Thoracic Spine.   Activity limitations that impact the plan of care are:      Bathing, Bending, Reading/Computer work, Sitting, Standing, Walking and Laying down.  3) Clinical presentation characteristics are:   Stable/Uncomplicated.  4) Decision-Making    Low complexity using standardized patient assessment instrument and/or measureable assessment of functional outcome.  Cumulative Therapy Evaluation is: Low complexity.    Previous and current functional limitations:  (See Goal Flow Sheet for this information)    Short term and Long term goals: (See Goal Flow Sheet for this information)     Communication ability:  Patient appears to be able to clearly communicate and understand verbal and written communication  and follow directions correctly.  Treatment Explanation - The following has been discussed with the patient:   RX ordered/plan of care  Anticipated outcomes  Possible risks and side effects  This patient would benefit from PT intervention to resume normal activities.   Rehab potential is good.    Frequency:  1 X week, once daily  Duration:  for 6 weeks  Discharge Plan:  Achieve all LTG.  Independent in home treatment program.  Reach maximal therapeutic benefit.    Please refer to the daily flowsheet for treatment today, total treatment time and time spent performing 1:1 timed codes.

## 2018-05-24 ENCOUNTER — OFFICE VISIT (OUTPATIENT)
Dept: ADDICTION MEDICINE | Facility: CLINIC | Age: 48
End: 2018-05-24
Payer: COMMERCIAL

## 2018-05-24 VITALS
DIASTOLIC BLOOD PRESSURE: 66 MMHG | SYSTOLIC BLOOD PRESSURE: 92 MMHG | HEART RATE: 85 BPM | WEIGHT: 116 LBS | RESPIRATION RATE: 12 BRPM | TEMPERATURE: 98 F | BODY MASS INDEX: 18.72 KG/M2 | OXYGEN SATURATION: 100 %

## 2018-05-24 DIAGNOSIS — F19.20 CHEMICAL DEPENDENCY (H): Primary | ICD-10-CM

## 2018-05-24 LAB
AMPHETAMINES UR QL: ABNORMAL NG/ML
BARBITURATES UR QL SCN: ABNORMAL NG/ML
BENZODIAZ UR QL SCN: ABNORMAL NG/ML
BUPRENORPHINE UR QL: ABNORMAL NG/ML
CANNABINOIDS UR QL: ABNORMAL NG/ML
COCAINE UR QL SCN: ABNORMAL NG/ML
D-METHAMPHET UR QL: ABNORMAL NG/ML
METHADONE UR QL SCN: ABNORMAL NG/ML
OPIATES UR QL SCN: ABNORMAL NG/ML
OXYCODONE UR QL SCN: ABNORMAL NG/ML
PCP UR QL SCN: ABNORMAL NG/ML
PROPOXYPH UR QL: ABNORMAL NG/ML
TRICYCLICS UR QL SCN: ABNORMAL NG/ML

## 2018-05-24 PROCEDURE — 80306 DRUG TEST PRSMV INSTRMNT: CPT | Performed by: FAMILY MEDICINE

## 2018-05-24 PROCEDURE — 99214 OFFICE O/P EST MOD 30 MIN: CPT | Performed by: FAMILY MEDICINE

## 2018-05-24 NOTE — MR AVS SNAPSHOT
After Visit Summary   5/24/2018    Ayla Del Toro    MRN: [id number]           Patient Information     Date Of Birth          1970        Visit Information        Provider Department      5/24/2018 2:15 PM Graham Coburn MD [facility]        Today's Diagnoses     Chemical dependency (H)    -  1       Follow-ups after your visit        Your next 10 appointments already scheduled     May 25, 2018  1:40 PM CDT   [facility] Extremity with Barrie Thomas PT   [facility] ([facility])    [address] [place] [id number] [phone]            Jun 01, 2018  1:40 PM CDT   [facility]IG[facility] Extremity with Barrie Thomas PT   [facility] ([facility])    [address] [place] [id number] [phone]            Jun 08, 2018  9:50 AM CDT   R[facility]IGO Extremity with Barrie Thomas PT   [facility] ([facility])    [address] [place] [id number] [phone]              Who to contact     If you have questions or need follow up information about today's clinic visit or your schedule please contact [facility] directly at [phone].  Normal or non-critical lab and imaging results will be communicated to you by MyChart, letter or phone within 4 business days after the clinic has received the results. If you do not hear from us within 7 days, please contact the clinic through MyChart or phone. If you have a critical or abnormal lab result, we will notify you by phone as soon as possible.  Submit refill requests through [employer] or call your pharmacy and they will forward the refill request to us. Please allow 3 business days for your refill to be completed.          Additional Information About Your Visit        [employer] Information     [employer] lets you send messages to your doctor, view your test results, renew your prescriptions,  "schedule appointments and more. To sign up, go to [email or web address] . Click on \"Log in\" on the left side of the screen, which will take you to the Welcome page. Then click on \"Sign up Now\" on the right side of the page.     You will be asked to enter the access code listed below, as well as some personal information. Please follow the directions to create your username and password.     Your access code is: [email or web address]  Expires: 7/10/2018  6:27 PM     Your access code will  in 90 days. If you need help or a new code, please call your [place] clinic or [phone].        Care EveryWhere ID     This is your Care EveryWhere ID. This could be used by other organizations to access your [facility] medical records  [id number]        Your Vitals Were     Pulse Temperature Respirations Pulse Oximetry BMI (Body Mass Index)       85 98  F (36.7  C) (Oral) 12 100% 18.72 kg/m2        Blood Pressure from Last 3 Encounters:   18 92/66   18 114/72   17 97/62    Weight from Last 3 Encounters:   18 116 lb (52.6 kg)   18 116 lb (52.6 kg)   18 117 lb (53.1 kg)              We Performed the Following     Urine Drugs of Abuse Screen Panel 13        Primary Care Provider Office Phone # Fax #    [facility] [phone] [address] [facility] [address]        Equal Access to Services     NONI HERMAN AH: Hadii didi ku hadasho Soomaali, waaxda luqadaha, qaybta kaalmada adeegyada, waxclary pinto. So [facility] [phone].    ATENCIÓN: Si habla español, tiene a nogueira disposición servicios gratuitos de asistencia lingüística. Llame al [phone].    We comply with applicable federal civil rights laws and [place] laws. We do not discriminate on the basis of race, color, national origin, age, disability, sex, sexual orientation, or gender identity.            Thank you!     Thank you for choosing [facility]  for your care. Our goal is always to provide you with excellent care. Hearing back from our patients is one way we can continue to improve our services. Please take a few minutes to complete the written survey that you may receive in the mail after your visit with us. Thank you!             Your Updated Medication List - Protect others around you: Learn how to safely use, store and throw away your medicines at www.disposemymeds.org.          This list is accurate as of 5/24/18  4:15 PM.  Always use your most recent med list.                   Brand Name Dispense Instructions for use Diagnosis    ADVIL -38 MG Tabs   Generic drug:  Ibuprofen-Diphenhydramine Cit      Take 1 tablet by mouth nightly as needed (sleep)        buPROPion 150 MG 24 hr tablet    WELLBUTRIN XL    30 tablet    Take 1 tablet (150 mg) by mouth daily    Moderate episode of recurrent major depressive disorder (H)       calcium carbonate 500 MG chewable tablet    TUMS    150 tablet    Take 1 tablet (500 mg) by mouth 3 times daily as needed for heartburn        CERTAVITE/ANTIOXIDANTS Tabs tablet      Take 1 tablet by mouth daily        cetirizine 10 MG tablet    zyrTEC    30 tablet    Take 1 tablet (10 mg) by mouth daily    Chronic allergic rhinitis due to pollen, unspecified seasonality       D3 HIGH POTENCY 1000 units Caps      Take 1,000 Int'l Units by mouth once a week Approved by PCP on 4/23/2018        fluticasone 50 MCG/ACT spray    FLONASE     Spray 1-2 sprays into both nostrils daily        folic acid 1 MG tablet    FOLVITE     Take 1 mg by mouth daily        hydrOXYzine 25 MG tablet    ATARAX    120 tablet    Take 1 tablet (25 mg) by mouth every 4 hours as needed for anxiety    Moderate episode of recurrent major depressive disorder (H)       naltrexone 50 MG tablet    DEPADE;REVIA    30 tablet    Take 1 tablet (50 mg) by mouth daily    Uncomplicated alcohol dependence (H)       naproxen 500 MG tablet     NAPROSYN    30 tablet    Take 1 tablet (500 mg) by mouth 2 times daily as needed for moderate pain    Myofascial pain on left side       nicotine polacrilex 2 MG lozenge    COMMIT    144 lozenge    Place 1 lozenge (2 mg) inside cheek every hour as needed for smoking cessation    Nicotine dependence

## 2018-05-24 NOTE — PROGRESS NOTES
SUBJECTIVE:   Ayla Del Toro is a 48 year old female who presents to clinic today for the following health issues:          ADDICTION MEDICINE NOTE:    PREVIOUS VISIT AS BELOW      HERE IN FOLLOW UP OF TREATMENT OF ALCOHOL DEPENDENCE WITH ORAL NALTREXONE    DOING VERY WELL    GOING TO REFUGE SUPPORT AA ([facility])    DOES NOT LIKE TRADITIONAL AA BUT WILLING TO GIVE NEW GROUPS A TRY    MOTIVATED FOR RECOVERY    NO CRAVING; FEELS NALTREXONE HELPING    NO SIDE EFFECTS    HAD LIVER FUNCTIONS CHECKED RECENTLY AT [facility] - NORMAL    WILL CONTINUE NALTREXONE BUT HAVE HER PRIMARY PRESCRIBE IT    FOLLOW UP AS NECESSARY         PREVIOUS VISIT:    INITIAL VISIT    LODGING PLUS PATIENT     ALCOHOL DEPENDENCE     1ST TREATMENT     HERE TO DISCUSS MAT FOR ALCOHOL DEPENDENCE     WAS DRINKING 1/75 LITER WINE PER DAY    LONG TIME USER    AVOIDED TREATMENT DUE TO DENIAL    CAME IN BECAUSE SHE KNEW IT WAS TIME    INTERESTED IN NALTREXONE BUT NOT ANTABUSE    NO CRAVING WHILE IN TREATMENT BUT SHE ANTICIPATES IT WHEN SHE IS DISCHARGED    NOT WORRIED ABOUT ADHERENCE    DISCUSSED PROS AND CONS OF NALTREXONE, RISKS AND BENEFITS    PMH: REVIEWED     SOCIAL HISTORY:   LIVES IN [facility]   2 CHILDREN AGED 18.15  WAS WORKING AS [employer]        Problem list and histories reviewed & adjusted, as indicated.  Additional history: as documented    Patient Active Problem List   Diagnosis     Depressive disorder, not elsewhere classified     Anxiety state     Allergic rhinitis due to other allergen     Alcohol abuse     Chemical dependency (H)     Cervical pain     Chronic left-sided low back pain, with sciatica presence unspecified     Past Surgical History:   Procedure Laterality Date     C TOTAL ABDOM HYSTERECTOMY       HC REMOVE TONSILS/ADENOIDS,<11 Y/O       HC TOOTH EXTRACTION W/FORCEP         Social History   Substance Use Topics     Smoking status: Current Some Day Smoker     Packs/day: 0.25     Types:  Cigarettes     Last attempt to quit: 5/18/2006     Smokeless tobacco: Never Used      Comment: recreationally     Alcohol use Yes     Family History   Problem Relation Age of Onset     Depression Mother      CANCER Mother      Ductal carcinoma in situ (DCIS)     CANCER Father      Pseudomyxoma peritonei      Depression Maternal Aunt          Current Outpatient Prescriptions   Medication Sig Dispense Refill     buPROPion (WELLBUTRIN XL) 150 MG 24 hr tablet Take 1 tablet (150 mg) by mouth daily 30 tablet 1     calcium carbonate (TUMS) 500 MG chewable tablet Take 1 tablet (500 mg) by mouth 3 times daily as needed for heartburn 150 tablet      cetirizine (ZYRTEC) 10 MG tablet Take 1 tablet (10 mg) by mouth daily 30 tablet 1     Cholecalciferol (D3 HIGH POTENCY) 1000 units CAPS Take 1,000 Int'l Units by mouth once a week Approved by PCP on 4/23/2018       fluticasone (FLONASE) 50 MCG/ACT spray Spray 1-2 sprays into both nostrils daily       folic acid (FOLVITE) 1 MG tablet Take 1 mg by mouth daily       hydrOXYzine (ATARAX) 25 MG tablet Take 1 tablet (25 mg) by mouth every 4 hours as needed for anxiety 120 tablet 1     Ibuprofen-Diphenhydramine Cit (ADVIL PM) 200-38 MG TABS Take 1 tablet by mouth nightly as needed (sleep)       multivitamin, therapeutic with minerals (CERTAVITE/ANTIOXIDANTS) TABS tablet Take 1 tablet by mouth daily       naltrexone (DEPADE;REVIA) 50 MG tablet Take 1 tablet (50 mg) by mouth daily 30 tablet 1     naproxen (NAPROSYN) 500 MG tablet Take 1 tablet (500 mg) by mouth 2 times daily as needed for moderate pain 30 tablet 1     nicotine polacrilex (COMMIT) 2 MG lozenge Place 1 lozenge (2 mg) inside cheek every hour as needed for smoking cessation 144 lozenge 1     Allergies   Allergen Reactions     Hydrocodone-Acetaminophen Nausea and Vomiting     Oxycodone Nausea and Vomiting     Labs reviewed in EPIC    Reviewed and updated as needed this visit by clinical staff  Tobacco  Meds       Reviewed and  updated as needed this visit by Provider         ROS:      OBJECTIVE:     BP 92/66 (BP Location: Left arm)  Pulse 85  Temp 98  F (36.7  C) (Oral)  Resp 12  Wt 116 lb (52.6 kg)  SpO2 100%  BMI 18.72 kg/m2  Body mass index is 18.72 kg/(m^2).     ROS:  Constitutional, HEENT, cardiovascular, pulmonary, gi and gu systems are negative, except as otherwise noted.    BP 92/66 (BP Location: Left arm)  Pulse 85  Temp 98  F (36.7  C) (Oral)  Resp 12  Wt 116 lb (52.6 kg)  SpO2 100%  BMI 18.72 kg/m2  EXAM:  GENERAL APPEARANCE: healthy, alert and no distress  EYES: Eyes grossly normal to inspection, PERRL and conjunctivae and sclerae normal  NEURO: Normal strength and tone, mentation intact and speech normal  PSYCH: mentation appears normal and affect normal/bright  MENTAL STATUS EXAM:  Appearance/Behavior: No apparent distress and Casually groomed  Speech: Normal  Mood/Affect: normal affect  Insight: Adequate      Diagnostic Test Results:  none     ASSESSMENT:   ALCOHOL DEPENDENCE      PLAN:       ICD-10-CM    1. Chemical dependency (H) F19.20 Urine Drugs of Abuse Screen Panel 13           MEDICATIONS:   No orders of the defined types were placed in this encounter.         - Continue other medications without change  FUTURE APPOINTMENTS:       - Follow-up visit ROSARIO Coburn MD  [facility] ADDICTION MEDICINE

## 2018-05-25 ENCOUNTER — THERAPY VISIT (OUTPATIENT)
Dept: PHYSICAL THERAPY | Facility: CLINIC | Age: 48
End: 2018-05-25
Payer: COMMERCIAL

## 2018-05-25 DIAGNOSIS — G89.29 CHRONIC LEFT-SIDED LOW BACK PAIN, WITH SCIATICA PRESENCE UNSPECIFIED: ICD-10-CM

## 2018-05-25 DIAGNOSIS — M54.5 CHRONIC LEFT-SIDED LOW BACK PAIN, WITH SCIATICA PRESENCE UNSPECIFIED: ICD-10-CM

## 2018-05-25 DIAGNOSIS — M54.2 CERVICAL PAIN: ICD-10-CM

## 2018-05-25 PROCEDURE — 97140 MANUAL THERAPY 1/> REGIONS: CPT | Mod: GP | Performed by: PHYSICAL THERAPIST

## 2018-05-25 PROCEDURE — 97110 THERAPEUTIC EXERCISES: CPT | Mod: GP | Performed by: PHYSICAL THERAPIST

## 2018-06-01 ENCOUNTER — THERAPY VISIT (OUTPATIENT)
Dept: PHYSICAL THERAPY | Facility: CLINIC | Age: 48
End: 2018-06-01
Payer: COMMERCIAL

## 2018-06-01 DIAGNOSIS — M54.2 CERVICAL PAIN: ICD-10-CM

## 2018-06-01 DIAGNOSIS — G89.29 CHRONIC LEFT-SIDED LOW BACK PAIN, WITH SCIATICA PRESENCE UNSPECIFIED: ICD-10-CM

## 2018-06-01 DIAGNOSIS — M54.5 CHRONIC LEFT-SIDED LOW BACK PAIN, WITH SCIATICA PRESENCE UNSPECIFIED: ICD-10-CM

## 2018-06-01 PROCEDURE — 97110 THERAPEUTIC EXERCISES: CPT | Mod: GP | Performed by: PHYSICAL THERAPIST

## 2018-06-01 PROCEDURE — 97140 MANUAL THERAPY 1/> REGIONS: CPT | Mod: GP | Performed by: PHYSICAL THERAPIST

## 2018-06-08 ENCOUNTER — THERAPY VISIT (OUTPATIENT)
Dept: PHYSICAL THERAPY | Facility: CLINIC | Age: 48
End: 2018-06-08
Payer: COMMERCIAL

## 2018-06-08 DIAGNOSIS — M54.2 CERVICAL PAIN: ICD-10-CM

## 2018-06-08 DIAGNOSIS — M54.5 CHRONIC LEFT-SIDED LOW BACK PAIN, WITH SCIATICA PRESENCE UNSPECIFIED: ICD-10-CM

## 2018-06-08 DIAGNOSIS — G89.29 CHRONIC LEFT-SIDED LOW BACK PAIN, WITH SCIATICA PRESENCE UNSPECIFIED: ICD-10-CM

## 2018-06-08 PROCEDURE — 97140 MANUAL THERAPY 1/> REGIONS: CPT | Mod: GP | Performed by: PHYSICAL THERAPIST

## 2018-06-08 PROCEDURE — 97110 THERAPEUTIC EXERCISES: CPT | Mod: GP | Performed by: PHYSICAL THERAPIST

## 2018-06-22 ENCOUNTER — THERAPY VISIT (OUTPATIENT)
Dept: PHYSICAL THERAPY | Facility: CLINIC | Age: 48
End: 2018-06-22
Payer: COMMERCIAL

## 2018-06-22 DIAGNOSIS — M54.5 CHRONIC LEFT-SIDED LOW BACK PAIN, WITH SCIATICA PRESENCE UNSPECIFIED: ICD-10-CM

## 2018-06-22 DIAGNOSIS — G89.29 CHRONIC LEFT-SIDED LOW BACK PAIN, WITH SCIATICA PRESENCE UNSPECIFIED: ICD-10-CM

## 2018-06-22 DIAGNOSIS — M54.2 CERVICAL PAIN: ICD-10-CM

## 2018-06-22 PROCEDURE — 97110 THERAPEUTIC EXERCISES: CPT | Mod: GP | Performed by: PHYSICAL THERAPIST

## 2018-06-29 ENCOUNTER — THERAPY VISIT (OUTPATIENT)
Dept: PHYSICAL THERAPY | Facility: CLINIC | Age: 48
End: 2018-06-29
Payer: COMMERCIAL

## 2018-06-29 DIAGNOSIS — M54.2 CERVICAL PAIN: ICD-10-CM

## 2018-06-29 DIAGNOSIS — M54.5 CHRONIC LEFT-SIDED LOW BACK PAIN, WITH SCIATICA PRESENCE UNSPECIFIED: ICD-10-CM

## 2018-06-29 DIAGNOSIS — G89.29 CHRONIC LEFT-SIDED LOW BACK PAIN, WITH SCIATICA PRESENCE UNSPECIFIED: ICD-10-CM

## 2018-06-29 PROCEDURE — 97110 THERAPEUTIC EXERCISES: CPT | Mod: GP | Performed by: PHYSICAL THERAPIST

## 2018-06-29 PROCEDURE — 97140 MANUAL THERAPY 1/> REGIONS: CPT | Mod: GP | Performed by: PHYSICAL THERAPIST

## 2018-06-29 NOTE — PROGRESS NOTES
DISCHARGE REPORT    Progress reporting period is from 5/18/2018 to 6/29/2018.       SUBJECTIVE  Subjective changes noted by patient:  Patient reports improving pain symptoms, ROM, strength and overall function. Patient reports decreased pain with reaching. Patient is independent with her HEP and ready to be discharged from PT.    Current Pain level: 0/10.     Initial Pain level: 8/10.   Changes in function:  Yes (See Goal flowsheet attached for changes in current functional level)  Adverse reaction to treatment or activity: None    OBJECTIVE  Changes noted in objective findings:  Yes,   Objective: lumbar AROM: FL = WNL, EXT = min loss, right SG = min loss, left SG = WNL; cervical AROM WNL throughout     ASSESSMENT/PLAN  Updated problem list and treatment plan: Diagnosis 1:  Neck and LBP    STG/LTGs have been met or progress has been made towards goals:  Yes (See Goal flow sheet completed today.)  Assessment of Progress: The patient's condition is improving.  Self Management Plans:  Patient is independent in a home treatment program.  Patient is independent in self management of symptoms.    Ayla continues to require the following intervention to meet STG and LTG's:  PT intervention is no longer required to meet STG/LTG.    Recommendations:  This patient is ready to be discharged from therapy and continue their home treatment program.    Please refer to the daily flowsheet for treatment today, total treatment time and time spent performing 1:1 timed codes.

## 2018-08-27 ENCOUNTER — TELEPHONE (OUTPATIENT)
Dept: ADDICTION MEDICINE | Facility: CLINIC | Age: 48
End: 2018-08-27

## 2018-08-27 RX ORDER — POLYETHYLENE GLYCOL 3350 17 G
2 POWDER IN PACKET (EA) ORAL
Qty: 144 LOZENGE | Refills: 1 | OUTPATIENT
Start: 2018-08-27

## 2018-09-15 ENCOUNTER — OFFICE VISIT (OUTPATIENT)
Dept: URGENT CARE | Facility: URGENT CARE | Age: 48
End: 2018-09-15
Payer: COMMERCIAL

## 2018-09-15 VITALS
BODY MASS INDEX: 18.99 KG/M2 | DIASTOLIC BLOOD PRESSURE: 75 MMHG | SYSTOLIC BLOOD PRESSURE: 114 MMHG | OXYGEN SATURATION: 100 % | HEIGHT: 67 IN | WEIGHT: 121 LBS | TEMPERATURE: 97.4 F | HEART RATE: 87 BPM

## 2018-09-15 DIAGNOSIS — R07.0 THROAT PAIN: Primary | ICD-10-CM

## 2018-09-15 LAB
DEPRECATED S PYO AG THROAT QL EIA: NORMAL
SPECIMEN SOURCE: NORMAL

## 2018-09-15 PROCEDURE — 87880 STREP A ASSAY W/OPTIC: CPT | Performed by: FAMILY MEDICINE

## 2018-09-15 PROCEDURE — 99213 OFFICE O/P EST LOW 20 MIN: CPT | Performed by: PHYSICIAN ASSISTANT

## 2018-09-15 PROCEDURE — 87081 CULTURE SCREEN ONLY: CPT | Performed by: PHYSICIAN ASSISTANT

## 2018-09-15 NOTE — PROGRESS NOTES
SUBJECTIVE:  Ayla Del Toro is a 48 year old female with a chief complaint of sore throat.  Onset of symptoms was 3 day(s) ago.    Course of illness: sudden onset and worsening.  Severity moderate  Current and Associated symptoms: fatigue  Treatment measures tried include Tylenol/Ibuprofen.  Predisposing factors include None.    Past Medical History:   Diagnosis Date     Alcohol abuse      Allergic rhinitis      Current Outpatient Prescriptions   Medication Sig Dispense Refill     buPROPion (WELLBUTRIN XL) 150 MG 24 hr tablet Take 1 tablet (150 mg) by mouth daily 30 tablet 1     Cholecalciferol (D3 HIGH POTENCY) 1000 units CAPS Take 1,000 Int'l Units by mouth once a week Approved by PCP on 4/23/2018       FLUoxetine HCl (PROZAC PO)        hydrOXYzine (ATARAX) 25 MG tablet Take 1 tablet (25 mg) by mouth every 4 hours as needed for anxiety 120 tablet 1     naltrexone (DEPADE;REVIA) 50 MG tablet Take 1 tablet (50 mg) by mouth daily 30 tablet 1     calcium carbonate (TUMS) 500 MG chewable tablet Take 1 tablet (500 mg) by mouth 3 times daily as needed for heartburn 150 tablet      cetirizine (ZYRTEC) 10 MG tablet Take 1 tablet (10 mg) by mouth daily (Patient not taking: Reported on 9/15/2018) 30 tablet 1     fluticasone (FLONASE) 50 MCG/ACT spray Spray 1-2 sprays into both nostrils daily       folic acid (FOLVITE) 1 MG tablet Take 1 mg by mouth daily       Ibuprofen-Diphenhydramine Cit (ADVIL PM) 200-38 MG TABS Take 1 tablet by mouth nightly as needed (sleep)       multivitamin, therapeutic with minerals (CERTAVITE/ANTIOXIDANTS) TABS tablet Take 1 tablet by mouth daily       naproxen (NAPROSYN) 500 MG tablet Take 1 tablet (500 mg) by mouth 2 times daily as needed for moderate pain (Patient not taking: Reported on 9/15/2018) 30 tablet 1     nicotine polacrilex (COMMIT) 2 MG lozenge Place 1 lozenge (2 mg) inside cheek every hour as needed for smoking cessation (Patient not taking: Reported on 9/15/2018) 144 lozenge 1  "    Social History   Substance Use Topics     Smoking status: Current Some Day Smoker     Packs/day: 0.25     Types: Cigarettes     Last attempt to quit: 5/18/2006     Smokeless tobacco: Never Used      Comment: recreationally     Alcohol use Yes       ROS:  CONSTITUTIONAL:NEGATIVE for fever, chills, change in weight  EYES: NEGATIVE for vision changes or irritation  ENT/MOUTH: sore throat  RESP:NEGATIVE for significant cough or SOB    OBJECTIVE:   /75  Pulse 87  Temp 97.4  F (36.3  C) (Tympanic)  Ht 5' 6.5\" (1.689 m)  Wt 121 lb (54.9 kg)  SpO2 100%  BMI 19.24 kg/m2  GENERAL APPEARANCE: healthy, alert and no distress  EYES: EOMI,  PERRL, conjunctiva clear  HENT: ear canals and TM's normal.  Nose normal.  Pharynx erythematous with some exudate noted.  NECK: supple, non-tender to palpation, no adenopathy noted  RESP: lungs clear to auscultation - no rales, rhonchi or wheezes  CV: regular rates and rhythm, normal S1 S2, no murmur noted  ABDOMEN:  soft, nontender, no HSM or masses and bowel sounds normal  SKIN: no suspicious lesions or rashes    Rapid Strep test is negative; await throat culture results.    ASSESSMENT:    1. Throat pain    - Strep, Rapid Screen    PLAN:   See orders in epic.   Symptomatic treat with gargles, lozenges, and OTC analgesic as needed. Follow-up with primary clinic if not improving over the next 3 days.      "

## 2018-09-15 NOTE — MR AVS SNAPSHOT
"              After Visit Summary   9/15/2018    Ayla Del Toro    MRN: [id number]           Patient Information     Date Of Birth          1970        Visit Information        Provider Department      9/15/2018 5:20 PM Lonnie Sharif PA-C [facility]        Today's Diagnoses     Throat pain    -  1       Follow-ups after your visit        Who to contact     If you have questions or need follow up information about today's clinic visit or your schedule please contact [facility] directly at [phone].  Normal or non-critical lab and imaging results will be communicated to you by [employer], letter or phone within 4 business days after the clinic has received the results. If you do not hear from us within 7 days, please contact the clinic through [employer] or phone. If you have a critical or abnormal lab result, we will notify you by phone as soon as possible.  Submit refill requests through [employer] or call your pharmacy and they will forward the refill request to us. Please allow 3 business days for your refill to be completed.          Additional Information About Your Visit        MyChart Information     [employer] lets you send messages to your doctor, view your test results, renew your prescriptions, schedule appointments and more. To sign up, go to [email or web address] . Click on \"Log in\" on the left side of the screen, which will take you to the Welcome page. Then click on \"Sign up Now\" on the right side of the page.     You will be asked to enter the access code listed below, as well as some personal information. Please follow the directions to create your username and password.     Your access code is: [email or web address]  Expires: 2018  6:03 PM     Your access code will  in 90 days. If you need help or a new code, please call your [facility] clinic or [phone].        Care EveryWhere ID     This is your Care EveryWhere ID. This could be used by other " "organizations to access your [facility] medical records  [id number]        Your Vitals Were     Pulse Temperature Height Pulse Oximetry BMI (Body Mass Index)       87 97.4  F (36.3  C) (Tympanic) 5' 6.5\" (1.689 m) 100% 19.24 kg/m2        Blood Pressure from Last 3 Encounters:   09/15/18 114/75   05/24/18 92/66   05/07/18 114/72    Weight from Last 3 Encounters:   09/15/18 121 lb (54.9 kg)   05/24/18 116 lb (52.6 kg)   05/07/18 116 lb (52.6 kg)              We Performed the Following     Strep, Rapid Screen        Primary Care Provider Office Phone # Fax #    [facility] [phone] [id number] [address]        Equal Access to Services     NONI HERMAN : Jordan Potts, waaxearnestine andrews, qaybta kaalmada evelio, kenisha benavidez . So [facility] [phone].    ATENCIÓN: Si habla español, tiene a nogueira disposición servicios gratuitos de asistencia lingüística. Oneyda al [phone].    We comply with applicable federal civil rights laws and [place] laws. We do not discriminate on the basis of race, color, national origin, age, disability, sex, sexual orientation, or gender identity.            Thank you!     Thank you for choosing [facility]  for your care. Our goal is always to provide you with excellent care. Hearing back from our patients is one way we can continue to improve our services. Please take a few minutes to complete the written survey that you may receive in the mail after your visit with us. Thank you!             Your Updated Medication List - Protect others around you: Learn how to safely use, store and throw away your medicines at www.disposemymeds.org.          This list is accurate as of 9/15/18  6:03 PM.  Always use your most recent med list.                   Brand Name Dispense Instructions for use Diagnosis    ADVIL -38 MG Tabs   Generic drug:  " Ibuprofen-Diphenhydramine Cit      Take 1 tablet by mouth nightly as needed (sleep)        buPROPion 150 MG 24 hr tablet    WELLBUTRIN XL    30 tablet    Take 1 tablet (150 mg) by mouth daily    Moderate episode of recurrent major depressive disorder (H)       calcium carbonate 500 MG chewable tablet    TUMS    150 tablet    Take 1 tablet (500 mg) by mouth 3 times daily as needed for heartburn        CERTAVITE/ANTIOXIDANTS Tabs tablet      Take 1 tablet by mouth daily        cetirizine 10 MG tablet    zyrTEC    30 tablet    Take 1 tablet (10 mg) by mouth daily    Chronic allergic rhinitis due to pollen, unspecified seasonality       D3 HIGH POTENCY 1000 units Caps      Take 1,000 Int'l Units by mouth once a week Approved by PCP on 4/23/2018        fluticasone 50 MCG/ACT spray    FLONASE     Spray 1-2 sprays into both nostrils daily        folic acid 1 MG tablet    FOLVITE     Take 1 mg by mouth daily        hydrOXYzine 25 MG tablet    ATARAX    120 tablet    Take 1 tablet (25 mg) by mouth every 4 hours as needed for anxiety    Moderate episode of recurrent major depressive disorder (H)       naltrexone 50 MG tablet    DEPADE;REVIA    30 tablet    Take 1 tablet (50 mg) by mouth daily    Uncomplicated alcohol dependence (H)       naproxen 500 MG tablet    NAPROSYN    30 tablet    Take 1 tablet (500 mg) by mouth 2 times daily as needed for moderate pain    Myofascial pain on left side       nicotine polacrilex 2 MG lozenge    COMMIT    144 lozenge    Place 1 lozenge (2 mg) inside cheek every hour as needed for smoking cessation    Nicotine dependence       PROZAC PO

## 2018-09-16 ENCOUNTER — NURSE TRIAGE (OUTPATIENT)
Dept: NURSING | Facility: CLINIC | Age: 48
End: 2018-09-16

## 2018-09-16 LAB
BACTERIA SPEC CULT: NORMAL
SPECIMEN SOURCE: NORMAL

## 2018-10-21 DIAGNOSIS — M25.562 ACUTE PAIN OF LEFT KNEE: ICD-10-CM

## 2018-10-22 NOTE — TELEPHONE ENCOUNTER
"Requested Prescriptions   Pending Prescriptions Disp Refills     diclofenac (VOLTAREN) 50 MG EC tablet [Pharmacy Med Name: DICLOFENAC SODIUM 50MG DR TABLETS]    Last Written Prescription Date:  11/25/2017  Last Fill Quantity: 30,  # refills: 1   Last office visit: 9/15/2018 with prescribing provider:  Scott Fenton     Future Office Visit:     30 tablet 0     Sig: TAKE 1 TABLET(50 MG) BY MOUTH THREE TIMES DAILY AS NEEDED FOR MODERATE PAIN    NSAID Medications Failed    10/21/2018  9:08 AM       Failed - Normal ALT on file in past 12 months    Recent Labs   Lab Test  04/11/18   0841   ALT  55*            Failed - Normal AST on file in past 12 months    Recent Labs   Lab Test  04/11/18   0841   AST  75*            Passed - Blood pressure under 140/90 in past 12 months    BP Readings from Last 3 Encounters:   09/15/18 114/75   05/24/18 92/66   05/07/18 114/72                Passed - Recent (12 mo) or future (30 days) visit within the authorizing provider's specialty    Patient had office visit in the last 12 months or has a visit in the next 30 days with authorizing provider or within the authorizing provider's specialty.  See \"Patient Info\" tab in inbasket, or \"Choose Columns\" in Meds & Orders section of the refill encounter.             Passed - Patient is age 6-64 years       Passed - Normal CBC on file in past 12 months    Recent Labs   Lab Test  04/11/18   0841   WBC  5.0   RBC  3.70*   HGB  13.3   HCT  39.7   PLT  247                Passed - No active pregnancy on record       Passed - Normal serum creatinine on file in past 12 months    Recent Labs   Lab Test  04/11/18   0841   CR  0.57            Passed - No positive pregnancy test in past 12 months          "

## 2018-10-24 NOTE — TELEPHONE ENCOUNTER
Refill request denied.  Informed pharmacy to send refill request to pt's primary provider.      Farida Boyle RN

## 2018-10-30 DIAGNOSIS — M25.562 ACUTE PAIN OF LEFT KNEE: ICD-10-CM

## 2018-10-30 NOTE — TELEPHONE ENCOUNTER
Routing refill request to provider for review/approval because:  Labs out of range:  Ast, alt

## 2018-10-30 NOTE — TELEPHONE ENCOUNTER
"Requested Prescriptions   Pending Prescriptions Disp Refills     diclofenac (VOLTAREN) 50 MG EC tablet [Pharmacy Med Name: DICLOFENAC SODIUM 50MG DR TABLETS]    Last Written Prescription Date:  11/25/2017  Last Fill Quantity: 30,  # refills: 1   Last office visit: 9/15/2018 with prescribing provider:  Scott Fenton     Future Office Visit:     30 tablet 0     Sig: TAKE 1 TABLET(50 MG) BY MOUTH THREE TIMES DAILY AS NEEDED FOR MODERATE PAIN    NSAID Medications Failed    10/30/2018  6:58 AM       Failed - Normal ALT on file in past 12 months    Recent Labs   Lab Test  04/11/18   0841   ALT  55*            Failed - Normal AST on file in past 12 months    Recent Labs   Lab Test  04/11/18   0841   AST  75*            Passed - Blood pressure under 140/90 in past 12 months    BP Readings from Last 3 Encounters:   09/15/18 114/75   05/24/18 92/66   05/07/18 114/72                Passed - Recent (12 mo) or future (30 days) visit within the authorizing provider's specialty    Patient had office visit in the last 12 months or has a visit in the next 30 days with authorizing provider or within the authorizing provider's specialty.  See \"Patient Info\" tab in inbasket, or \"Choose Columns\" in Meds & Orders section of the refill encounter.             Passed - Patient is age 6-64 years       Passed - Normal CBC on file in past 12 months    Recent Labs   Lab Test  04/11/18   0841   WBC  5.0   RBC  3.70*   HGB  13.3   HCT  39.7   PLT  247                Passed - No active pregnancy on record       Passed - Normal serum creatinine on file in past 12 months    Recent Labs   Lab Test  04/11/18   0841   CR  0.57            Passed - No positive pregnancy test in past 12 months      \    "

## 2018-11-09 DIAGNOSIS — M25.562 ACUTE PAIN OF LEFT KNEE: ICD-10-CM

## 2018-11-10 NOTE — TELEPHONE ENCOUNTER
Informed pharmacy of following response from provider:    Refused by: Maco Mallory PA-C  Refusal reason: Patient never under provider care    Monique Bryant RN

## 2018-11-13 NOTE — TELEPHONE ENCOUNTER
"Requested Prescriptions   Pending Prescriptions Disp Refills     diclofenac (VOLTAREN) 50 MG EC tablet [Pharmacy Med Name: DICLOFENAC SODIUM 50MG DR TABLETS]    Last Written Prescription Date:  11/25/2017  Last Fill Quantity: 30,  # refills: 1   Last office visit: 9/15/2018 with prescribing provider:  Scott Fenton     Future Office Visit:     30 tablet 0     Sig: TAKE 1 TABLET(50 MG) BY MOUTH THREE TIMES DAILY AS NEEDED FOR MODERATE PAIN    NSAID Medications Failed    11/9/2018  8:59 PM       Failed - Normal ALT on file in past 12 months    Recent Labs   Lab Test  04/11/18   0841   ALT  55*            Failed - Normal AST on file in past 12 months    Recent Labs   Lab Test  04/11/18   0841   AST  75*            Passed - Blood pressure under 140/90 in past 12 months    BP Readings from Last 3 Encounters:   09/15/18 114/75   05/24/18 92/66   05/07/18 114/72                Passed - Recent (12 mo) or future (30 days) visit within the authorizing provider's specialty    Patient had office visit in the last 12 months or has a visit in the next 30 days with authorizing provider or within the authorizing provider's specialty.  See \"Patient Info\" tab in inbasket, or \"Choose Columns\" in Meds & Orders section of the refill encounter.             Passed - Patient is age 6-64 years       Passed - Normal CBC on file in past 12 months    Recent Labs   Lab Test  04/11/18   0841   WBC  5.0   RBC  3.70*   HGB  13.3   HCT  39.7   PLT  247                Passed - No active pregnancy on record       Passed - Normal serum creatinine on file in past 12 months    Recent Labs   Lab Test  04/11/18   0841   CR  0.57            Passed - No positive pregnancy test in past 12 months          "

## 2018-11-14 NOTE — TELEPHONE ENCOUNTER
Pt does not come to [facility].  Pharmacy informed and has also been informed from last refill requests.  Phyllis Araiza RN

## 2018-12-18 ENCOUNTER — THERAPY VISIT (OUTPATIENT)
Dept: PHYSICAL THERAPY | Facility: CLINIC | Age: 48
End: 2018-12-18
Payer: COMMERCIAL

## 2018-12-18 DIAGNOSIS — M53.3 CHRONIC LEFT SACROILIAC PAIN: Primary | ICD-10-CM

## 2018-12-18 DIAGNOSIS — G89.29 CHRONIC LEFT SACROILIAC PAIN: Primary | ICD-10-CM

## 2018-12-18 PROCEDURE — 97112 NEUROMUSCULAR REEDUCATION: CPT | Mod: GP | Performed by: PHYSICAL THERAPIST

## 2018-12-18 PROCEDURE — 97140 MANUAL THERAPY 1/> REGIONS: CPT | Mod: GP | Performed by: PHYSICAL THERAPIST

## 2018-12-18 PROCEDURE — 97161 PT EVAL LOW COMPLEX 20 MIN: CPT | Mod: GP | Performed by: PHYSICAL THERAPIST

## 2018-12-18 NOTE — PROGRESS NOTES
[facility] Initial Evaluation  Subjective:  The history is provided by the patient. No  was used.   Ayla Del Toro is a 48 year old female with a sacroiliac (February 2003) condition.  Occurance: pregnancy.  Condition occurred: other.  This is a chronic condition     Site of Pain: left SIJ   Radiates to: none.  Pain is described as aching and stabbing and is intermittent Pain Scale: 7-8/10   Associated symptoms:  Loss of motion and loss of motion/stiffness. Pain is worse in the A.M..  Exacerbated by: sex, prolonged walking, prolonged standing. Relieved by: pain medication,   Pain course: same to worse.  Special testing: none.  Previous treatment: chiropractic and acupuncture.  Improvement with previous treatment: chiropractic: trey, acupuncture: temporary relief.  General health as reported by patient is good.                                         Objective:  Standing Alignment:        Lumbar deviations alignment: Increased lumbar lordosis.      Knee deviations alignment: genu recurvatum.                     Lumbar/SI Evaluation  ROM:    AROM Lumbar:   Flexion:          90%   Ext:                    WNl    Side Bend:        Left:  50% with left SIJ pain     Right:  50%   Rotation:           Left:     Right:   Side Glide:        Left:     Right:           Lumbar Myotomes:  not assessed            Lumbar DTR's:  not assessed                                                               Hip AROM: L: flexion: 98 degrees, er:35 degrees, IR: 2 degrees, R: Flexion: 96 degrees    with left anterior hip pain, er: 30 degrees, IR: 2 degrees,right posterior psis , no tenderness noted with palpation of left SIj, mmt: gluteus medius: 3+/5 (L), 4+/5 (R) , gluteus chris 3+/5 (L), 4+/5 (R), hamstrings: 5-/5 (R), 4+/5L), poor abdominal tone, faulty abdominal recruitment pattern with compensatory abdominal bulging, left gluteus chris and pirformis tightness, poor dissociation of hip from  pelvis with hip flexion              General     ROS    Assessment/Plan:    Patient is a 48 year old female with sacral and left side hip complaints.    Patient has the following significant findings with corresponding treatment plan.                Diagnosis 1:  Left Sacro-iliac Joint Pain   Pain -  hot/cold therapy, manual therapy, self management, education, directional preference exercise and home program  Decreased strength - therapeutic exercise, therapeutic activities and home program  Impaired muscle performance - neuro re-education and home program  Decreased function - therapeutic activities and home program  Instability -  Therapeutic Activity  Therapeutic Exercise  Neuromuscular Re-education  home program    Therapy Evaluation Codes:   1) History comprised of:   Personal factors that impact the plan of care:      None.    Comorbidity factors that impact the plan of care are:      None.     Medications impacting care: None.  2) Examination of Body Systems comprised of:   Body structures and functions that impact the plan of care:      Sacral illiac joint.   Activity limitations that impact the plan of care are:      Sex.  3) Clinical presentation characteristics are:   Stable/Uncomplicated.  4) Decision-Making    Low complexity using standardized patient assessment instrument and/or measureable assessment of functional outcome.  Cumulative Therapy Evaluation is: Low complexity.    Previous and current functional limitations:  (See Goal Flow Sheet for this information)    Short term and Long term goals: (See Goal Flow Sheet for this information)     Communication ability:  Patient appears to be able to clearly communicate and understand verbal and written communication and follow directions correctly.  Treatment Explanation - The following has been discussed with the patient:   RX ordered/plan of care  Anticipated outcomes  Possible risks and side effects  This patient would benefit from PT intervention to  resume normal activities.   Rehab potential is good.    Frequency:  1 X week, once daily  Duration:  for 6 weeks  Discharge Plan:  Achieve all LTG.  Independent in home treatment program.  Reach maximal therapeutic benefit.    Please refer to the daily flowsheet for treatment today, total treatment time and time spent performing 1:1 timed codes.

## 2018-12-18 NOTE — LETTER
[facility]  [address] [place] [address]  [phone]    2019    Re: Ayla Del Toro   :   1970  MRN:  [id number]   REFERRING PHYSICIAN:   Jeff Bliss    [facility]    Date of Initial Evaluation: 19  Visits:  Rxs Used: 1  Reason for Referral:  Chronic left sacroiliac pain    EVALUATION SUMMARY    [facility]  Subjective:  The history is provided by the patient. No  was used.   Ayla Del Toro is a 48 year old female with a sacroiliac (2003) condition.  Occurance: pregnancy.  Condition occurred: other.  This is a chronic condition     Site of Pain: left SIJ   Radiates to: none.  Pain is described as aching and stabbing and is intermittent Pain Scale: 7-8/10   Associated symptoms:  Loss of motion and loss of motion/stiffness. Pain is worse in the A.M..  Exacerbated by: sex, prolonged walking, prolonged standing. Relieved by: pain medication,   Pain course: same to worse.  Special testing: none.  Previous treatment: chiropractic and acupuncture.  Improvement with previous treatment: chiropractic: trey, acupuncture: temporary relief.  General health as reported by patient is good.                  Objective:  Standing Alignment:    Lumbar deviations alignment: Increased lumbar lordosis.  Knee deviations alignment: genu recurvatum.       Lumbar/SI Evaluation  ROM:    AROM Lumbar:   Flexion:          90%   Ext:                    WNl    Side Bend:        Left:  50% with left SIJ pain     Right:  50%   Rotation:           Left:     Right:   Side Glide:        Left:     Right:     Lumbar Myotomes:  not assessed  Lumbar DTR's:  not assessed    Hip AROM: L: flexion: 98 degrees, er:35 degrees, IR: 2 degrees, R: Flexion: 96 degrees with left anterior hip pain, er: 30 degrees, IR: 2 degrees,right posterior psis , no tenderness noted with  palpation of left SIj, mmt: gluteus medius: 3+/5 (L), 4+/5 (R) , gluteus chris 3+/5 (L), 4+/5 (R), hamstrings: 5-/5 (R), 4+/5L), poor abdominal tone, faulty abdominal recruitment pattern with compensatory abdominal bulging, left gluteus chris and pirformis tightness, poor dissociation of hip from pelvis with hip flexion              General   ROS    Assessment/Plan:    Patient is a 48 year old female with sacral and left side hip complaints.    Patient has the following significant findings with corresponding treatment plan.                Diagnosis 1:  Left Sacro-iliac Joint Pain   Pain -  hot/cold therapy, manual therapy, self management, education, directional preference exercise and home program  Decreased strength - therapeutic exercise, therapeutic activities and home program  Impaired muscle performance - neuro re-education and home program  Decreased function - therapeutic activities and home program  Instability -  Therapeutic Activity  Therapeutic Exercise  Neuromuscular Re-education  home program    Therapy Evaluation Codes:   1) History comprised of:   Personal factors that impact the plan of care:      None.    Comorbidity factors that impact the plan of care are:      None.     Medications impacting care: None.  2) Examination of Body Systems comprised of:   Body structures and functions that impact the plan of care:      Sacral illiac joint.   Activity limitations that impact the plan of care are:      Sex.  3) Clinical presentation characteristics are:   Stable/Uncomplicated.  4) Decision-Making    Low complexity using standardized patient assessment instrument and/or measureable assessment of functional outcome.  Cumulative Therapy Evaluation is: Low complexity.    Previous and current functional limitations:  (See Goal Flow Sheet for this information)    Short term and Long term goals: (See Goal Flow Sheet for this information)     Communication ability:  Patient appears to be able to clearly  communicate and understand verbal and written communication and follow directions correctly.  Treatment Explanation - The following has been discussed with the patient:   RX ordered/plan of care  Anticipated outcomes  Possible risks and side effects  This patient would benefit from PT intervention to resume normal activities.   Rehab potential is good.    Frequency:  1 X week, once daily  Duration:  for 6 weeks  Discharge Plan:  Achieve all LTG.  Independent in home treatment program.  Reach maximal therapeutic benefit.    Thank you for your referral.    INQUIRIES  Therapist: America Wright PT   [facility] [address] [address]  Phone: [phone]  Fax: [phone]

## 2019-01-02 ENCOUNTER — THERAPY VISIT (OUTPATIENT)
Dept: PHYSICAL THERAPY | Facility: CLINIC | Age: 49
End: 2019-01-02
Payer: COMMERCIAL

## 2019-01-02 DIAGNOSIS — M53.3 SACROILIAC JOINT PAIN: Primary | ICD-10-CM

## 2019-01-02 PROCEDURE — 97110 THERAPEUTIC EXERCISES: CPT | Mod: GP | Performed by: PHYSICAL THERAPIST

## 2019-01-02 PROCEDURE — 97112 NEUROMUSCULAR REEDUCATION: CPT | Mod: GP | Performed by: PHYSICAL THERAPIST

## 2019-01-08 ENCOUNTER — THERAPY VISIT (OUTPATIENT)
Dept: PHYSICAL THERAPY | Facility: CLINIC | Age: 49
End: 2019-01-08
Payer: COMMERCIAL

## 2019-01-08 DIAGNOSIS — G89.29 CHRONIC LEFT SACROILIAC PAIN: Primary | ICD-10-CM

## 2019-01-08 DIAGNOSIS — M53.3 CHRONIC LEFT SACROILIAC PAIN: Primary | ICD-10-CM

## 2019-01-08 PROCEDURE — 97112 NEUROMUSCULAR REEDUCATION: CPT | Mod: GP | Performed by: PHYSICAL THERAPIST

## 2019-01-08 PROCEDURE — 97140 MANUAL THERAPY 1/> REGIONS: CPT | Mod: GP | Performed by: PHYSICAL THERAPIST

## 2019-01-15 ENCOUNTER — THERAPY VISIT (OUTPATIENT)
Dept: PHYSICAL THERAPY | Facility: CLINIC | Age: 49
End: 2019-01-15
Payer: COMMERCIAL

## 2019-01-15 DIAGNOSIS — M53.3 PAIN OF LEFT SACROILIAC JOINT: Primary | ICD-10-CM

## 2019-01-15 PROCEDURE — 97112 NEUROMUSCULAR REEDUCATION: CPT | Mod: GP | Performed by: PHYSICAL THERAPIST

## 2019-01-15 PROCEDURE — 97140 MANUAL THERAPY 1/> REGIONS: CPT | Mod: GP | Performed by: PHYSICAL THERAPIST

## 2019-02-12 ENCOUNTER — THERAPY VISIT (OUTPATIENT)
Dept: PHYSICAL THERAPY | Facility: CLINIC | Age: 49
End: 2019-02-12
Payer: COMMERCIAL

## 2019-02-12 DIAGNOSIS — M53.3 PAIN OF LEFT SACROILIAC JOINT: Primary | ICD-10-CM

## 2019-02-12 PROCEDURE — 97112 NEUROMUSCULAR REEDUCATION: CPT | Mod: GP | Performed by: PHYSICAL THERAPIST

## 2019-02-12 PROCEDURE — 97140 MANUAL THERAPY 1/> REGIONS: CPT | Mod: GP | Performed by: PHYSICAL THERAPIST

## 2019-10-01 PROBLEM — M54.50 CHRONIC LEFT-SIDED LOW BACK PAIN: Status: RESOLVED | Noted: 2018-05-18 | Resolved: 2018-06-29

## 2023-11-06 NOTE — TELEPHONE ENCOUNTER
"nicotine polacrilex (COMMIT) 2 MG lozenge  Requested Prescriptions  Last Written Prescription Date:  4/17/18  Last Fill Quantity: 144,  # refills: 1   Last office visit: 5/24/2018 with prescribing provider:     Future Office Visit:       Pending Prescriptions Disp Refills     nicotine polacrilex (COMMIT) 2 MG lozenge 144 lozenge 1     Sig: Place 1 lozenge (2 mg) inside cheek every hour as needed for smoking cessation    Partial Cholinergic Nicotinic Agonist Agents Passed    8/27/2018  2:39 PM       Passed - Blood pressure under 140/90 in past 12 months    BP Readings from Last 3 Encounters:   05/24/18 92/66   05/07/18 114/72   11/25/17 97/62                Passed - Recent (12 mo) or future (30 days) visit within the authorizing provider's specialty    Patient had office visit in the last 12 months or has a visit in the next 30 days with authorizing provider or within the authorizing provider's specialty.  See \"Patient Info\" tab in inbasket, or \"Choose Columns\" in Meds & Orders section of the refill encounter.           Passed - Patient is 18 years of age or older       Passed - Patient is not pregnant       Passed - No positive pregnancy test on file in past 12 months          "
Needs provider approval after 12 weeks per RN protocol. Routing to provider for review.  Patient wants to continue.   
(4) walks frequently